# Patient Record
Sex: FEMALE | Race: WHITE | Employment: UNEMPLOYED | URBAN - METROPOLITAN AREA
[De-identification: names, ages, dates, MRNs, and addresses within clinical notes are randomized per-mention and may not be internally consistent; named-entity substitution may affect disease eponyms.]

---

## 2022-03-19 PROBLEM — D50.0 IRON DEFICIENCY ANEMIA DUE TO CHRONIC BLOOD LOSS: Status: ACTIVE | Noted: 2021-10-05

## 2022-06-08 ENCOUNTER — OFFICE VISIT (OUTPATIENT)
Dept: OBGYN CLINIC | Age: 47
End: 2022-06-08
Payer: COMMERCIAL

## 2022-06-08 VITALS
DIASTOLIC BLOOD PRESSURE: 90 MMHG | BODY MASS INDEX: 26 KG/M2 | HEIGHT: 59 IN | SYSTOLIC BLOOD PRESSURE: 152 MMHG | WEIGHT: 129 LBS

## 2022-06-08 DIAGNOSIS — R45.4 IRRITABILITY: ICD-10-CM

## 2022-06-08 DIAGNOSIS — N91.2 AMENORRHEA, UNSPECIFIED: ICD-10-CM

## 2022-06-08 DIAGNOSIS — R23.2 HOT FLASHES: ICD-10-CM

## 2022-06-08 DIAGNOSIS — N91.2 AMENORRHEA, UNSPECIFIED: Primary | ICD-10-CM

## 2022-06-08 PROCEDURE — 99214 OFFICE O/P EST MOD 30 MIN: CPT | Performed by: OBSTETRICS & GYNECOLOGY

## 2022-06-08 ASSESSMENT — ENCOUNTER SYMPTOMS
GASTROINTESTINAL NEGATIVE: 1
ALLERGIC/IMMUNOLOGIC NEGATIVE: 1
RESPIRATORY NEGATIVE: 1
EYES NEGATIVE: 1

## 2022-06-08 NOTE — PROGRESS NOTES
Name: Meryle Ellison  Date: 2022  YOB: 1975  LMP: Patient's last menstrual period was 2022. Jose Crow is a 56 y.o.   who is here for a problem visit. She historically has had menorrhagia, anemia and dysmenorrhea. She had the worst period ever in Feb than a lighter one in March and nothing since then. She would be ok with that but has developed headaches, moodiness, difficulty sleeping, hot flashes. Us in : enlarge 221cm3    Birth control: BTL  Menstrual status: irregular cycles and heavy  Gyn Surgery: s/p BTL    Health Maintenance:  Pap Smear: last pap in 2021, pathology WNL  Mammo: never done  HPV vaccine: not done  Colonoscopy: not ever done  Dexa scan: not needed    No flowsheet data found. No flowsheet data found. Review of Systems   Constitutional: Negative. HENT: Negative. Eyes: Negative. Respiratory: Negative. Cardiovascular: Negative. Gastrointestinal: Negative. Endocrine: Negative. Genitourinary: Positive for dyspareunia, menstrual problem and vaginal bleeding. Musculoskeletal: Negative. Skin: Negative. Allergic/Immunologic: Negative. Neurological: Negative. Hematological: Negative. Psychiatric/Behavioral: Negative. Past Medical History:  No date: Anemia  No date: Fibroid, uterine     Past Surgical History:  2853,4849:  SECTION      Comment:  x2  No date: LAP,TUBAL CAUTERY       Current Outpatient Medications:     ferrous sulfate 220 (44 Fe) MG/5ML solution, Take by mouth daily (Patient not taking: Reported on 2022), Disp: , Rfl:     LORazepam (ATIVAN) 0.5 MG tablet, Take 1 tab twice a day as needed for anxiety. (Patient not taking: Reported on 2022), Disp: , Rfl:     Family Cancer History:   Cancer-related family history includes Cancer in her sister. There is no history of Ovarian Cancer, Colon Cancer, or Breast Cancer.      Social History:  reports that she has quit smoking. She has never used smokeless tobacco. She reports previous alcohol use. She reports that she does not use drugs. Ob History:  No obstetric history on file. Sexual History:  reports being sexually active and has had partner(s) who are male. She reports using the following method of birth control/protection: Surgical.    PHYSICAL EXAM:  Vitals:  height is 4' 11\" (1.499 m) and weight is 129 lb (58.5 kg). Her blood pressure is 152/90 (abnormal). Body mass index is 26.05 kg/m². Physical Exam  Constitutional:       General: She is awake. She is not in acute distress. Appearance: Normal appearance. She is not ill-appearing. HENT:      Head: Normocephalic and atraumatic. Eyes:      Conjunctiva/sclera: Conjunctivae normal.   Cardiovascular:      Rate and Rhythm: Normal rate. Pulmonary:      Effort: Pulmonary effort is normal.   Musculoskeletal:         General: Normal range of motion. Cervical back: Normal range of motion. Skin:     General: Skin is warm and dry. Neurological:      General: No focal deficit present. Mental Status: She is alert and oriented to person, place, and time. Motor: Motor function is intact. Coordination: Coordination is intact. Psychiatric:         Behavior: Behavior normal.         Cognition and Memory: Cognition normal.         Judgment: Judgment normal.          The external genitalia is normal.  Urethral meatus is normal. Vagina is pink and rugated. The bladder and urethra are normal .  The cervix is normal.  The uterus is normal and enlarged. The ovaries are normal.       Assessment: 56 y.o. , No obstetric history on file. , here for problem visit  1. amenorrhea: will check hrt labs    2. Mood changes , irritability, increases in headaches with auras.      Ila Milton MD

## 2022-06-09 LAB
25(OH)D3 SERPL-MCNC: 20.9 NG/ML (ref 30–100)
BASOPHILS # BLD: 0 K/UL (ref 0–0.2)
BASOPHILS NFR BLD: 1 % (ref 0–2)
DIFFERENTIAL METHOD BLD: ABNORMAL
EOSINOPHIL # BLD: 0.1 K/UL (ref 0–0.8)
EOSINOPHIL NFR BLD: 1 % (ref 0.5–7.8)
ERYTHROCYTE [DISTWIDTH] IN BLOOD BY AUTOMATED COUNT: 13.4 % (ref 11.9–14.6)
ESTRADIOL SERPL-MCNC: 298.55 PG/ML
FSH SERPL-ACNC: 1.9 MIU/ML
HCT VFR BLD AUTO: 43.8 % (ref 35.8–46.3)
HGB BLD-MCNC: 14.3 G/DL (ref 11.7–15.4)
IMM GRANULOCYTES # BLD AUTO: 0 K/UL (ref 0–0.5)
IMM GRANULOCYTES NFR BLD AUTO: 0 % (ref 0–5)
LH SERPL-ACNC: 5.2 MIU/ML
LYMPHOCYTES # BLD: 1.6 K/UL (ref 0.5–4.6)
LYMPHOCYTES NFR BLD: 28 % (ref 13–44)
MCH RBC QN AUTO: 27.3 PG (ref 26.1–32.9)
MCHC RBC AUTO-ENTMCNC: 32.6 G/DL (ref 31.4–35)
MCV RBC AUTO: 83.7 FL (ref 79.6–97.8)
MONOCYTES # BLD: 0.4 K/UL (ref 0.1–1.3)
MONOCYTES NFR BLD: 7 % (ref 4–12)
NEUTS SEG # BLD: 3.5 K/UL (ref 1.7–8.2)
NEUTS SEG NFR BLD: 63 % (ref 43–78)
NRBC # BLD: 0 K/UL (ref 0–0.2)
PLATELET # BLD AUTO: 244 K/UL (ref 150–450)
PMV BLD AUTO: 11.3 FL (ref 9.4–12.3)
RBC # BLD AUTO: 5.23 M/UL (ref 4.05–5.2)
T4 FREE SERPL-MCNC: 1 NG/DL (ref 0.78–1.46)
WBC # BLD AUTO: 5.6 K/UL (ref 4.3–11.1)

## 2022-06-22 ENCOUNTER — OFFICE VISIT (OUTPATIENT)
Dept: OBGYN CLINIC | Age: 47
End: 2022-06-22
Payer: COMMERCIAL

## 2022-06-22 ENCOUNTER — PREP FOR PROCEDURE (OUTPATIENT)
Dept: OBGYN CLINIC | Age: 47
End: 2022-06-22

## 2022-06-22 ENCOUNTER — TELEPHONE (OUTPATIENT)
Dept: OBGYN CLINIC | Age: 47
End: 2022-06-22

## 2022-06-22 ENCOUNTER — HOSPITAL ENCOUNTER (EMERGENCY)
Age: 47
Discharge: HOME | End: 2022-06-22
Attending: EMERGENCY MEDICINE
Payer: COMMERCIAL

## 2022-06-22 VITALS
WEIGHT: 125 LBS | BODY MASS INDEX: 25.2 KG/M2 | HEART RATE: 80 BPM | DIASTOLIC BLOOD PRESSURE: 90 MMHG | TEMPERATURE: 98.5 F | HEIGHT: 59 IN | SYSTOLIC BLOOD PRESSURE: 125 MMHG | RESPIRATION RATE: 18 BRPM | OXYGEN SATURATION: 97 %

## 2022-06-22 VITALS
BODY MASS INDEX: 25.4 KG/M2 | WEIGHT: 126 LBS | DIASTOLIC BLOOD PRESSURE: 84 MMHG | SYSTOLIC BLOOD PRESSURE: 124 MMHG | HEIGHT: 59 IN

## 2022-06-22 DIAGNOSIS — D25.1 INTRAMURAL, SUBMUCOUS, AND SUBSEROUS LEIOMYOMA OF UTERUS: ICD-10-CM

## 2022-06-22 DIAGNOSIS — D25.0 SUBMUCOUS UTERINE FIBROID: Primary | ICD-10-CM

## 2022-06-22 DIAGNOSIS — D25.9 UTERINE LEIOMYOMA, UNSPECIFIED LOCATION: Primary | ICD-10-CM

## 2022-06-22 DIAGNOSIS — D25.2 INTRAMURAL, SUBMUCOUS, AND SUBSEROUS LEIOMYOMA OF UTERUS: ICD-10-CM

## 2022-06-22 DIAGNOSIS — D25.0 INTRAMURAL, SUBMUCOUS, AND SUBSEROUS LEIOMYOMA OF UTERUS: ICD-10-CM

## 2022-06-22 DIAGNOSIS — N93.9 VAGINAL BLEEDING: ICD-10-CM

## 2022-06-22 DIAGNOSIS — N92.6 IRREGULAR MENSTRUAL BLEEDING: ICD-10-CM

## 2022-06-22 LAB
ABO + RH BLD: NORMAL
ALBUMIN SERPL-MCNC: 3.9 G/DL (ref 3.5–5)
ALBUMIN/GLOB SERPL: 1 {RATIO} (ref 1.2–3.5)
ALP SERPL-CCNC: 83 U/L (ref 50–136)
ALT SERPL-CCNC: 25 U/L (ref 12–65)
ANION GAP SERPL CALC-SCNC: 6 MMOL/L (ref 7–16)
AST SERPL-CCNC: 23 U/L (ref 15–37)
BACTERIA URNS QL MICRO: 0 /HPF
BASOPHILS # BLD: 0 K/UL (ref 0–0.2)
BASOPHILS NFR BLD: 0 % (ref 0–2)
BILIRUB SERPL-MCNC: 0.2 MG/DL (ref 0.2–1.1)
BLOOD GROUP ANTIBODIES SERPL: NORMAL
BUN SERPL-MCNC: 9 MG/DL (ref 6–23)
CALCIUM SERPL-MCNC: 8.6 MG/DL (ref 8.3–10.4)
CASTS URNS QL MICRO: 0 /LPF
CHLORIDE SERPL-SCNC: 107 MMOL/L (ref 98–107)
CO2 SERPL-SCNC: 27 MMOL/L (ref 21–32)
CREAT SERPL-MCNC: 0.74 MG/DL (ref 0.6–1)
CRYSTALS URNS QL MICRO: 0 /LPF
DIFFERENTIAL METHOD BLD: NORMAL
EOSINOPHIL # BLD: 0.1 K/UL (ref 0–0.8)
EOSINOPHIL NFR BLD: 2 % (ref 0.5–7.8)
EPI CELLS #/AREA URNS HPF: NORMAL /HPF
ERYTHROCYTE [DISTWIDTH] IN BLOOD BY AUTOMATED COUNT: 13.8 % (ref 11.9–14.6)
GLOBULIN SER CALC-MCNC: 3.9 G/DL (ref 2.3–3.5)
GLUCOSE SERPL-MCNC: 118 MG/DL (ref 65–100)
HCG UR QL: NEGATIVE
HCT VFR BLD AUTO: 39.8 % (ref 35.8–46.3)
HGB BLD-MCNC: 12.7 G/DL (ref 11.7–15.4)
IMM GRANULOCYTES # BLD AUTO: 0 K/UL (ref 0–0.5)
IMM GRANULOCYTES NFR BLD AUTO: 0 % (ref 0–5)
LYMPHOCYTES # BLD: 3 K/UL (ref 0.5–4.6)
LYMPHOCYTES NFR BLD: 37 % (ref 13–44)
MCH RBC QN AUTO: 26.7 PG (ref 26.1–32.9)
MCHC RBC AUTO-ENTMCNC: 31.9 G/DL (ref 31.4–35)
MCV RBC AUTO: 83.8 FL (ref 79.6–97.8)
MONOCYTES # BLD: 0.8 K/UL (ref 0.1–1.3)
MONOCYTES NFR BLD: 10 % (ref 4–12)
MUCOUS THREADS URNS QL MICRO: 0 /LPF
NEUTS SEG # BLD: 4.1 K/UL (ref 1.7–8.2)
NEUTS SEG NFR BLD: 51 % (ref 43–78)
NRBC # BLD: 0 K/UL (ref 0–0.2)
OTHER OBSERVATIONS: NORMAL
PLATELET # BLD AUTO: 340 K/UL (ref 150–450)
PMV BLD AUTO: 10.3 FL (ref 9.4–12.3)
POTASSIUM SERPL-SCNC: 3.6 MMOL/L (ref 3.5–5.1)
PROT SERPL-MCNC: 7.8 G/DL (ref 6.3–8.2)
RBC # BLD AUTO: 4.75 M/UL (ref 4.05–5.2)
RBC #/AREA URNS HPF: >100 /HPF
SODIUM SERPL-SCNC: 140 MMOL/L (ref 136–145)
SPECIMEN EXP DATE BLD: NORMAL
WBC # BLD AUTO: 8 K/UL (ref 4.3–11.1)
WBC URNS QL MICRO: NORMAL /HPF

## 2022-06-22 PROCEDURE — 99214 OFFICE O/P EST MOD 30 MIN: CPT | Performed by: OBSTETRICS & GYNECOLOGY

## 2022-06-22 PROCEDURE — 80053 COMPREHEN METABOLIC PANEL: CPT

## 2022-06-22 PROCEDURE — 99284 EMERGENCY DEPT VISIT MOD MDM: CPT

## 2022-06-22 PROCEDURE — 85025 COMPLETE CBC W/AUTO DIFF WBC: CPT

## 2022-06-22 PROCEDURE — 2580000003 HC RX 258: Performed by: EMERGENCY MEDICINE

## 2022-06-22 PROCEDURE — 81025 URINE PREGNANCY TEST: CPT

## 2022-06-22 PROCEDURE — 96360 HYDRATION IV INFUSION INIT: CPT

## 2022-06-22 PROCEDURE — 86901 BLOOD TYPING SEROLOGIC RH(D): CPT

## 2022-06-22 PROCEDURE — 81015 MICROSCOPIC EXAM OF URINE: CPT

## 2022-06-22 RX ORDER — 0.9 % SODIUM CHLORIDE 0.9 %
1000 INTRAVENOUS SOLUTION INTRAVENOUS
Status: COMPLETED | OUTPATIENT
Start: 2022-06-22 | End: 2022-06-22

## 2022-06-22 RX ORDER — MEGESTROL ACETATE 20 MG/1
20 TABLET ORAL DAILY
Qty: 30 TABLET | Refills: 0 | Status: SHIPPED | OUTPATIENT
Start: 2022-06-22 | End: 2022-06-22 | Stop reason: ALTCHOICE

## 2022-06-22 RX ORDER — TRANEXAMIC ACID 650 1/1
1300 TABLET ORAL 3 TIMES DAILY PRN
Qty: 30 TABLET | Refills: 12 | Status: SHIPPED | OUTPATIENT
Start: 2022-06-22 | End: 2022-06-27

## 2022-06-22 RX ADMIN — SODIUM CHLORIDE 1000 ML: 9 INJECTION, SOLUTION INTRAVENOUS at 01:57

## 2022-06-22 ASSESSMENT — PAIN SCALES - GENERAL: PAINLEVEL_OUTOF10: 8

## 2022-06-22 ASSESSMENT — PAIN DESCRIPTION - DESCRIPTORS: DESCRIPTORS: CRAMPING

## 2022-06-22 ASSESSMENT — ENCOUNTER SYMPTOMS
ABDOMINAL PAIN: 1
VOMITING: 0
COLOR CHANGE: 0
BACK PAIN: 1
SHORTNESS OF BREATH: 0
DIARRHEA: 0
SORE THROAT: 0
CONSTIPATION: 0
COUGH: 0
NAUSEA: 0
RHINORRHEA: 0

## 2022-06-22 ASSESSMENT — PAIN DESCRIPTION - ORIENTATION: ORIENTATION: LOWER

## 2022-06-22 ASSESSMENT — PAIN - FUNCTIONAL ASSESSMENT: PAIN_FUNCTIONAL_ASSESSMENT: 0-10

## 2022-06-22 ASSESSMENT — PAIN DESCRIPTION - FREQUENCY: FREQUENCY: CONTINUOUS

## 2022-06-22 ASSESSMENT — PAIN DESCRIPTION - LOCATION: LOCATION: ABDOMEN

## 2022-06-22 ASSESSMENT — PAIN DESCRIPTION - PAIN TYPE: TYPE: ACUTE PAIN

## 2022-06-22 NOTE — ED NOTES
I have reviewed discharge instructions with the patient. The patient verbalized understanding. Patient left ED via Discharge Method: ambulatory to Home with self. Opportunity for questions and clarification provided. Patient given 1 scripts. To continue your aftercare when you leave the hospital, you may receive an automated call from our care team to check in on how you are doing. This is a free service and part of our promise to provide the best care and service to meet your aftercare needs.  If you have questions, or wish to unsubscribe from this service please call [phone]. Thank you for Choosing our [facility] Emergency Department.         Wander Hilton RN  06/22/22 2385

## 2022-06-22 NOTE — ED PROVIDER NOTES
[facility] Emergency Department Provider Note                   PCP:                Ellie Galvan               Age: 56 y.o. Sex: female       ICD-10-CM    1. Uterine leiomyoma, unspecified location  D25.9    2. Vaginal bleeding  N93.9        DISPOSITION         New Prescriptions    MEGESTROL (MEGACE) 20 MG TABLET    Take 1 tablet by mouth daily       Orders Placed This Encounter   Procedures    CBC with Auto Differential    Comprehensive Metabolic Panel    Urinalysis, Micro    PELVIC EXAM SET UP    POCT Urinalysis no Micro    POC Pregnancy Urine Qual    POC Pregnancy Urine Qual    TYPE AND SCREEN        Roger Eubanks III, MD 3:28 AM      MDM  Number of Diagnoses or Management Options  Diagnosis management comments: Spoke with Dr. Crystal Franz on-call for Dr. Ruben Villarreal. She recommends with stable hemoglobin to start Megace and have patient follow-up in the office. Will discharge. Amount and/or Complexity of Data Reviewed  Clinical lab tests: ordered and reviewed    Patient Progress  Patient progress: stable       Varun Mckenna is a 56 y.o. female who presents to the Emergency Department with chief complaint of    Chief Complaint   Patient presents with    Vaginal Bleeding      Patient with a history of uterine fibroids and menorrhagia with anemia. Is followed by Dr. Ruben Vlilarreal OB. She had a hemoglobin of 14 June 8. Was seen out of town and in [place] [id number] with vaginal bleeding on Sunday with a hemoglobin of 12. Has continued to bleed since then heavier this evening. Feeling weak and lightheaded. Heart rate is 122 on arrival.    The history is provided by the patient. No  was used.    Vaginal Bleeding  Quality:  Bright red  Severity:  Severe  Duration:  5 days  Timing:  Constant  Progression:  Worsening  Chronicity:  Recurrent  Menstrual history:  Irregular  Context: at rest    Relieved by:  Nothing  Worsened by:  Nothing  Ineffective treatments:  None tried  Associated symptoms: abdominal pain (mild cramping), back pain, dizziness and fatigue    Associated symptoms: no dysuria, no fever, no nausea and no vaginal discharge        All other systems reviewed and are negative. Review of Systems   Constitutional: Positive for fatigue. Negative for chills and fever. HENT: Negative for rhinorrhea and sore throat. Respiratory: Negative for cough and shortness of breath. Cardiovascular: Negative for chest pain and palpitations. Gastrointestinal: Positive for abdominal pain (mild cramping). Negative for constipation, diarrhea, nausea and vomiting. Genitourinary: Positive for vaginal bleeding. Negative for dysuria, hematuria and vaginal discharge. Musculoskeletal: Positive for back pain. Negative for neck pain. Skin: Negative for color change and rash. Neurological: Positive for dizziness and light-headedness. Negative for numbness and headaches. All other systems reviewed and are negative. Past Medical History:   Diagnosis Date    Anemia     Fibroid, uterine         Past Surgical History:   Procedure Laterality Date     SECTION  2333,0133    x2    LAP,TUBAL CAUTERY          Family History   Problem Relation Age of Onset    Cancer Sister         cervical    Ovarian Cancer Neg Hx     Colon Cancer Neg Hx     Breast Cancer Neg Hx            Social Connections:     Frequency of Communication with Friends and Family: Not on file    Frequency of Social Gatherings with Friends and Family: Not on file    Attends Anabaptism Services: Not on file    Active Member of Clubs or Organizations: Not on file    Attends Club or Organization Meetings: Not on file    Marital Status: Not on file        Allergies   Allergen Reactions    Amoxicillin Itching and Rash    Morphine Nausea And Vomiting        Vitals signs and nursing note reviewed.    Patient Vitals for the past 4 hrs:   Temp Pulse Resp BP SpO2   22 0135 98 °F (36.7 °C) (!) 122 20 (!) 183/100 98 %          Physical Exam  Vitals and nursing note reviewed. Exam conducted with a chaperone present. Constitutional:       Appearance: Normal appearance. HENT:      Head: Normocephalic and atraumatic. Cardiovascular:      Rate and Rhythm: Regular rhythm. Tachycardia present. Pulmonary:      Effort: Pulmonary effort is normal.      Breath sounds: Normal breath sounds. No wheezing. Abdominal:      General: Bowel sounds are normal.      Palpations: Abdomen is soft. Tenderness: There is no abdominal tenderness. Genitourinary:     Exam position: Supine. Vagina: Foreign body present. Bleeding present. Cervix: Dilated. Cervical bleeding present. Comments: Pt with a dilated os and pedunculated mass protruding through cervix. Musculoskeletal:         General: No swelling. Normal range of motion. Cervical back: Normal range of motion. No tenderness. Skin:     General: Skin is warm and dry. Neurological:      Mental Status: She is alert. Procedures        Labs Reviewed   COMPREHENSIVE METABOLIC PANEL - Abnormal; Notable for the following components:       Result Value    Anion Gap 6 (*)     Glucose 118 (*)     Globulin 3.9 (*)     Albumin/Globulin Ratio 1.0 (*)     All other components within normal limits   CBC WITH AUTO DIFFERENTIAL   URINALYSIS, MICRO   POC PREGNANCY UR-QUAL   POCT URINALYSIS DIPSTICK   POC PREGNANCY UR-QUAL   TYPE AND SCREEN        No orders to display                            Voice dictation software was used during the making of this note. This software is not perfect and grammatical and other typographical errors may be present. This note has not been completely proofread for errors.      Melanie Franz III, MD  06/22/22 0357

## 2022-06-22 NOTE — ED TRIAGE NOTES
Pt states that she is having heavy vaginal bleeding that began last week. States that she has been seen and treated twice this week for the same c/o without any relief.   States that she has something hanging out of her vaginal masked on arrival

## 2022-06-22 NOTE — PROGRESS NOTES
Concha Cerrato is a 55 yo WF [id number] here for ED f/u of heavy vaginal bleeding and probable prolapsed fibroid. Has experienced worsening bleeding over several days, last night got very heavy over a period of hours with cramping thus went to ED> Has a known h/o fibroids for a few years, last US in 24 [place] was enlarged at ~ 228cc. Only two small IM  fibroids were measured, but the ES was 23mm so I suspect this SM fibroid was present at that time. Patient of Mirta Owen. Cee Gupta Rx'd megace to help with bleeding. All relevant previous notes, labs and/or imaging performed by Dr. Mirta Sosa and the ED were reviewed and confirmed with pt today. I interpreted these results and D/W pt my opinion and recommendations accordingly. Nevahe Arguelles  has a past medical history of Anemia, Fibroid, uterine, and Prolonged emergence from general anesthesia. .    OB History    Para Term  AB Living   4 3 3   1 2   SAB IAB Ectopic Molar Multiple Live Births   1         2      # Outcome Date GA Lbr Branden/2nd Weight Sex Delivery Anes PTL Lv   4 Term      CS-LTranv      3 Term  40w0d  6 lb 6 oz (2.892 kg) F CS-LTranv   MARCELLO   2 Term  40w0d  6 lb 8 oz (2.948 kg) F Vag-Spont   MARCELLO   1 SAB                  Her surgeries include  has a past surgical history that includes lap,tubal cautery;  section (3744,0590); and Dilation and curettage of uterus (). .    Her current meds are   Current Outpatient Medications on File Prior to Visit   Medication Sig Dispense Refill    ferrous sulfate 220 (44 Fe) MG/5ML solution Take by mouth daily  (Patient not taking: Reported on 2022)      LORazepam (ATIVAN) 0.5 MG tablet Take 1 tab twice a day as needed for anxiety. (Patient not taking: Reported on 2022)       No current facility-administered medications on file prior to visit.          Allergies   Allergen Reactions    Amoxicillin Itching and Rash    Morphine Nausea And Vomiting        Family history is significant for family history includes Cancer in her sister. Jaelyn Davidson  reports that she has quit smoking. She has never used smokeless tobacco. She reports previous alcohol use. She reports that she does not use drugs. She completed her Systems Review which is documented below. I tried to relate any problem to gyn systems and if not related she is referred to her PCP. Blood pressure 124/84, height 4' 11\" (1.499 m), weight 126 lb (57.2 kg). Body mass index is 25.45 kg/m². A&Ox3. NAD  NL thought/judgment  Psych - grossly NL, not anxious  Neuro - CN 2-12 grossly intact. NL gait and movement  HEENT - NC/AT EOMi, PERRL  Pelvic NL EGBUS and vagina. Blood present. There is a 2x3 prolapsed fibroid extending fully past the external cervical os. I tried to reduce it with ring forceps several times w/o success. Bimanual reveals the travis to be 8-10 wk sized and irregular. Cassia Mckeon was seen today for fibroids. Diagnoses and all orders for this visit:    Submucous uterine fibroid    Irregular menstrual bleeding  -     tranexamic acid (LYSTEDA) 650 MG TABS tablet; Take 2 tablets by mouth 3 times daily as needed (Heavy Menses) Take only on heavy days    Intramural, submucous, and subserous leiomyoma of uterus     Surgery recommend at least to remove the prolapsed fibroid. Also discussed hyst but prefers to avoid major surgery. Agree removal of this should clear up her sx, and if not a future hyst could be warranted. Rec proceed with surgery in 2 days but wants to wait a few days extra d/t her 's schedule. Will start TXA to help control bleeding and stop the megace which she did not fill. Me, use and s/e reviewed. Procedure to remove the fibroid removed in detail. Also will perform D&C/hysteroscopy to eval endometrium at the time. All questions answered to the patient's satisfaction. No follow-ups on file.     Davon Franz MD

## 2022-06-23 ENCOUNTER — ANESTHESIA (OUTPATIENT)
Dept: SURGERY | Age: 47
End: 2022-06-23
Payer: COMMERCIAL

## 2022-06-23 ENCOUNTER — HOSPITAL ENCOUNTER (EMERGENCY)
Age: 47
Discharge: HOME | End: 2022-06-24
Attending: EMERGENCY MEDICINE
Payer: COMMERCIAL

## 2022-06-23 ENCOUNTER — ANESTHESIA EVENT (OUTPATIENT)
Dept: SURGERY | Age: 47
End: 2022-06-23
Payer: COMMERCIAL

## 2022-06-23 DIAGNOSIS — D64.9 SYMPTOMATIC ANEMIA: ICD-10-CM

## 2022-06-23 DIAGNOSIS — N93.8 DYSFUNCTIONAL UTERINE BLEEDING: Primary | ICD-10-CM

## 2022-06-23 DIAGNOSIS — D25.9 FIBROID OF CERVIX: ICD-10-CM

## 2022-06-23 DIAGNOSIS — D50.0 IRON DEFICIENCY ANEMIA DUE TO CHRONIC BLOOD LOSS: ICD-10-CM

## 2022-06-23 LAB
ALBUMIN SERPL-MCNC: 3.7 G/DL (ref 3.5–5)
ALBUMIN/GLOB SERPL: 1 {RATIO} (ref 1.2–3.5)
ALP SERPL-CCNC: 75 U/L (ref 50–136)
ALT SERPL-CCNC: 21 U/L (ref 12–65)
ANION GAP SERPL CALC-SCNC: 4 MMOL/L (ref 7–16)
AST SERPL-CCNC: 16 U/L (ref 15–37)
BASOPHILS # BLD: 0.1 K/UL (ref 0–0.2)
BASOPHILS NFR BLD: 1 % (ref 0–2)
BILIRUB SERPL-MCNC: 0.2 MG/DL (ref 0.2–1.1)
BUN SERPL-MCNC: 7 MG/DL (ref 6–23)
CALCIUM SERPL-MCNC: 8.4 MG/DL (ref 8.3–10.4)
CHLORIDE SERPL-SCNC: 105 MMOL/L (ref 98–107)
CO2 SERPL-SCNC: 28 MMOL/L (ref 21–32)
CREAT SERPL-MCNC: 0.72 MG/DL (ref 0.6–1)
DIFFERENTIAL METHOD BLD: ABNORMAL
EOSINOPHIL # BLD: 0.1 K/UL (ref 0–0.8)
EOSINOPHIL NFR BLD: 2 % (ref 0.5–7.8)
ERYTHROCYTE [DISTWIDTH] IN BLOOD BY AUTOMATED COUNT: 13.8 % (ref 11.9–14.6)
GLOBULIN SER CALC-MCNC: 3.7 G/DL (ref 2.3–3.5)
GLUCOSE SERPL-MCNC: 106 MG/DL (ref 65–100)
HCT VFR BLD AUTO: 33.6 % (ref 35.8–46.3)
HGB BLD-MCNC: 10.8 G/DL (ref 11.7–15.4)
HISTORY CHECK: NORMAL
IMM GRANULOCYTES # BLD AUTO: 0 K/UL (ref 0–0.5)
IMM GRANULOCYTES NFR BLD AUTO: 0 % (ref 0–5)
LYMPHOCYTES # BLD: 2.4 K/UL (ref 0.5–4.6)
LYMPHOCYTES NFR BLD: 31 % (ref 13–44)
MCH RBC QN AUTO: 26.9 PG (ref 26.1–32.9)
MCHC RBC AUTO-ENTMCNC: 32.1 G/DL (ref 31.4–35)
MCV RBC AUTO: 83.6 FL (ref 79.6–97.8)
MONOCYTES # BLD: 0.6 K/UL (ref 0.1–1.3)
MONOCYTES NFR BLD: 8 % (ref 4–12)
NEUTS SEG # BLD: 4.4 K/UL (ref 1.7–8.2)
NEUTS SEG NFR BLD: 58 % (ref 43–78)
NRBC # BLD: 0 K/UL (ref 0–0.2)
PLATELET # BLD AUTO: 332 K/UL (ref 150–450)
PMV BLD AUTO: 9.9 FL (ref 9.4–12.3)
POTASSIUM SERPL-SCNC: 3.1 MMOL/L (ref 3.5–5.1)
PROT SERPL-MCNC: 7.4 G/DL (ref 6.3–8.2)
RBC # BLD AUTO: 4.02 M/UL (ref 4.05–5.2)
SODIUM SERPL-SCNC: 137 MMOL/L (ref 136–145)
WBC # BLD AUTO: 7.5 K/UL (ref 4.3–11.1)

## 2022-06-23 PROCEDURE — 99284 EMERGENCY DEPT VISIT MOD MDM: CPT

## 2022-06-23 PROCEDURE — 86901 BLOOD TYPING SEROLOGIC RH(D): CPT

## 2022-06-23 PROCEDURE — 96365 THER/PROPH/DIAG IV INF INIT: CPT

## 2022-06-23 PROCEDURE — 85025 COMPLETE CBC W/AUTO DIFF WBC: CPT

## 2022-06-23 PROCEDURE — 86923 COMPATIBILITY TEST ELECTRIC: CPT

## 2022-06-23 PROCEDURE — 2580000003 HC RX 258: Performed by: EMERGENCY MEDICINE

## 2022-06-23 PROCEDURE — 80053 COMPREHEN METABOLIC PANEL: CPT

## 2022-06-23 PROCEDURE — 2500000003 HC RX 250 WO HCPCS: Performed by: EMERGENCY MEDICINE

## 2022-06-23 PROCEDURE — 6370000000 HC RX 637 (ALT 250 FOR IP): Performed by: EMERGENCY MEDICINE

## 2022-06-23 RX ORDER — SODIUM CHLORIDE, SODIUM LACTATE, POTASSIUM CHLORIDE, CALCIUM CHLORIDE 600; 310; 30; 20 MG/100ML; MG/100ML; MG/100ML; MG/100ML
INJECTION, SOLUTION INTRAVENOUS CONTINUOUS
Status: CANCELLED | OUTPATIENT
Start: 2022-06-23

## 2022-06-23 RX ORDER — FENTANYL CITRATE 50 UG/ML
100 INJECTION, SOLUTION INTRAMUSCULAR; INTRAVENOUS
Status: CANCELLED | OUTPATIENT
Start: 2022-06-23 | End: 2022-06-23

## 2022-06-23 RX ORDER — MIDAZOLAM HYDROCHLORIDE 2 MG/2ML
2 INJECTION, SOLUTION INTRAMUSCULAR; INTRAVENOUS
Status: CANCELLED | OUTPATIENT
Start: 2022-06-23 | End: 2022-06-23

## 2022-06-23 RX ORDER — TRANEXAMIC ACID 10 MG/ML
1000 INJECTION, SOLUTION INTRAVENOUS ONCE
Status: COMPLETED | OUTPATIENT
Start: 2022-06-23 | End: 2022-06-23

## 2022-06-23 RX ORDER — MEGESTROL ACETATE 40 MG/1
60 TABLET ORAL ONCE
Status: COMPLETED | OUTPATIENT
Start: 2022-06-23 | End: 2022-06-23

## 2022-06-23 RX ORDER — SODIUM CHLORIDE 0.9 % (FLUSH) 0.9 %
5-40 SYRINGE (ML) INJECTION EVERY 12 HOURS SCHEDULED
Status: CANCELLED | OUTPATIENT
Start: 2022-06-23

## 2022-06-23 RX ORDER — ACETAMINOPHEN 500 MG
1000 TABLET ORAL ONCE
Status: CANCELLED | OUTPATIENT
Start: 2022-06-23 | End: 2022-06-23

## 2022-06-23 RX ORDER — SODIUM CHLORIDE 0.9 % (FLUSH) 0.9 %
5-40 SYRINGE (ML) INJECTION PRN
Status: CANCELLED | OUTPATIENT
Start: 2022-06-23

## 2022-06-23 RX ORDER — 0.9 % SODIUM CHLORIDE 0.9 %
1000 INTRAVENOUS SOLUTION INTRAVENOUS
Status: COMPLETED | OUTPATIENT
Start: 2022-06-23 | End: 2022-06-23

## 2022-06-23 RX ORDER — SODIUM CHLORIDE 9 MG/ML
INJECTION, SOLUTION INTRAVENOUS PRN
Status: DISCONTINUED | OUTPATIENT
Start: 2022-06-23 | End: 2022-06-24 | Stop reason: HOSPADM

## 2022-06-23 RX ADMIN — MEGESTROL ACETATE 60 MG: 40 TABLET ORAL at 21:28

## 2022-06-23 RX ADMIN — TRANEXAMIC ACID 1000 MG: 10 INJECTION, SOLUTION INTRAVENOUS at 21:28

## 2022-06-23 RX ADMIN — SODIUM CHLORIDE 1000 ML: 9 INJECTION, SOLUTION INTRAVENOUS at 20:20

## 2022-06-23 ASSESSMENT — ENCOUNTER SYMPTOMS
ABDOMINAL PAIN: 1
RESPIRATORY NEGATIVE: 1

## 2022-06-24 VITALS
OXYGEN SATURATION: 99 % | BODY MASS INDEX: 25.4 KG/M2 | TEMPERATURE: 98.2 F | WEIGHT: 126 LBS | HEIGHT: 59 IN | RESPIRATION RATE: 16 BRPM | SYSTOLIC BLOOD PRESSURE: 128 MMHG | HEART RATE: 98 BPM | DIASTOLIC BLOOD PRESSURE: 85 MMHG

## 2022-06-24 PROCEDURE — 7100000000 HC PACU RECOVERY - FIRST 15 MIN: Performed by: OBSTETRICS & GYNECOLOGY

## 2022-06-24 PROCEDURE — 3600000014 HC SURGERY LEVEL 4 ADDTL 15MIN: Performed by: OBSTETRICS & GYNECOLOGY

## 2022-06-24 PROCEDURE — 2500000003 HC RX 250 WO HCPCS: Performed by: ANESTHESIOLOGY

## 2022-06-24 PROCEDURE — 7100000010 HC PHASE II RECOVERY - FIRST 15 MIN: Performed by: OBSTETRICS & GYNECOLOGY

## 2022-06-24 PROCEDURE — 6360000002 HC RX W HCPCS: Performed by: NURSE ANESTHETIST, CERTIFIED REGISTERED

## 2022-06-24 PROCEDURE — 2500000003 HC RX 250 WO HCPCS: Performed by: NURSE ANESTHETIST, CERTIFIED REGISTERED

## 2022-06-24 PROCEDURE — 2580000003 HC RX 258: Performed by: NURSE ANESTHETIST, CERTIFIED REGISTERED

## 2022-06-24 PROCEDURE — 7100000001 HC PACU RECOVERY - ADDTL 15 MIN: Performed by: OBSTETRICS & GYNECOLOGY

## 2022-06-24 PROCEDURE — 6360000002 HC RX W HCPCS: Performed by: ANESTHESIOLOGY

## 2022-06-24 PROCEDURE — 2709999900 HC NON-CHARGEABLE SUPPLY: Performed by: OBSTETRICS & GYNECOLOGY

## 2022-06-24 PROCEDURE — 3700000000 HC ANESTHESIA ATTENDED CARE: Performed by: OBSTETRICS & GYNECOLOGY

## 2022-06-24 PROCEDURE — 88305 TISSUE EXAM BY PATHOLOGIST: CPT

## 2022-06-24 PROCEDURE — 58558 HYSTEROSCOPY BIOPSY: CPT | Performed by: OBSTETRICS & GYNECOLOGY

## 2022-06-24 PROCEDURE — 58145 MYOMECTOMY VAG METHOD: CPT | Performed by: OBSTETRICS & GYNECOLOGY

## 2022-06-24 PROCEDURE — 3700000001 HC ADD 15 MINUTES (ANESTHESIA): Performed by: OBSTETRICS & GYNECOLOGY

## 2022-06-24 PROCEDURE — 3600000004 HC SURGERY LEVEL 4 BASE: Performed by: OBSTETRICS & GYNECOLOGY

## 2022-06-24 RX ORDER — HYDROMORPHONE HYDROCHLORIDE 1 MG/ML
0.25 INJECTION, SOLUTION INTRAMUSCULAR; INTRAVENOUS; SUBCUTANEOUS EVERY 5 MIN PRN
Status: DISCONTINUED | OUTPATIENT
Start: 2022-06-24 | End: 2022-06-24 | Stop reason: HOSPADM

## 2022-06-24 RX ORDER — PROPOFOL 10 MG/ML
INJECTION, EMULSION INTRAVENOUS PRN
Status: DISCONTINUED | OUTPATIENT
Start: 2022-06-24 | End: 2022-06-24 | Stop reason: SDUPTHER

## 2022-06-24 RX ORDER — IBUPROFEN 800 MG/1
800 TABLET ORAL EVERY 6 HOURS PRN
Qty: 90 TABLET | Refills: 0 | Status: SHIPPED | OUTPATIENT
Start: 2022-06-24

## 2022-06-24 RX ORDER — ROCURONIUM BROMIDE 10 MG/ML
INJECTION, SOLUTION INTRAVENOUS PRN
Status: DISCONTINUED | OUTPATIENT
Start: 2022-06-24 | End: 2022-06-24 | Stop reason: SDUPTHER

## 2022-06-24 RX ORDER — DIPHENHYDRAMINE HYDROCHLORIDE 50 MG/ML
12.5 INJECTION INTRAMUSCULAR; INTRAVENOUS
Status: DISCONTINUED | OUTPATIENT
Start: 2022-06-24 | End: 2022-06-24 | Stop reason: HOSPADM

## 2022-06-24 RX ORDER — MIDAZOLAM HYDROCHLORIDE 1 MG/ML
INJECTION INTRAMUSCULAR; INTRAVENOUS PRN
Status: DISCONTINUED | OUTPATIENT
Start: 2022-06-24 | End: 2022-06-24 | Stop reason: SDUPTHER

## 2022-06-24 RX ORDER — DEXAMETHASONE SODIUM PHOSPHATE 10 MG/ML
INJECTION INTRAMUSCULAR; INTRAVENOUS PRN
Status: DISCONTINUED | OUTPATIENT
Start: 2022-06-24 | End: 2022-06-24 | Stop reason: SDUPTHER

## 2022-06-24 RX ORDER — OXYCODONE HYDROCHLORIDE 5 MG/1
5 TABLET ORAL PRN
Status: DISCONTINUED | OUTPATIENT
Start: 2022-06-24 | End: 2022-06-24 | Stop reason: HOSPADM

## 2022-06-24 RX ORDER — ONDANSETRON 2 MG/ML
4 INJECTION INTRAMUSCULAR; INTRAVENOUS
Status: DISCONTINUED | OUTPATIENT
Start: 2022-06-24 | End: 2022-06-24 | Stop reason: HOSPADM

## 2022-06-24 RX ORDER — ONDANSETRON 2 MG/ML
INJECTION INTRAMUSCULAR; INTRAVENOUS PRN
Status: DISCONTINUED | OUTPATIENT
Start: 2022-06-24 | End: 2022-06-24 | Stop reason: SDUPTHER

## 2022-06-24 RX ORDER — SODIUM CHLORIDE, SODIUM LACTATE, POTASSIUM CHLORIDE, CALCIUM CHLORIDE 600; 310; 30; 20 MG/100ML; MG/100ML; MG/100ML; MG/100ML
INJECTION, SOLUTION INTRAVENOUS CONTINUOUS
Status: DISCONTINUED | OUTPATIENT
Start: 2022-06-24 | End: 2022-06-24 | Stop reason: HOSPADM

## 2022-06-24 RX ORDER — HYDROMORPHONE HYDROCHLORIDE 1 MG/ML
0.5 INJECTION, SOLUTION INTRAMUSCULAR; INTRAVENOUS; SUBCUTANEOUS EVERY 5 MIN PRN
Status: DISCONTINUED | OUTPATIENT
Start: 2022-06-24 | End: 2022-06-24 | Stop reason: HOSPADM

## 2022-06-24 RX ORDER — LABETALOL HYDROCHLORIDE 5 MG/ML
10 INJECTION, SOLUTION INTRAVENOUS
Status: COMPLETED | OUTPATIENT
Start: 2022-06-24 | End: 2022-06-24

## 2022-06-24 RX ORDER — MIDAZOLAM HYDROCHLORIDE 2 MG/2ML
1 INJECTION, SOLUTION INTRAMUSCULAR; INTRAVENOUS 2 TIMES DAILY PRN
Status: DISCONTINUED | OUTPATIENT
Start: 2022-06-24 | End: 2022-06-24 | Stop reason: HOSPADM

## 2022-06-24 RX ORDER — FENTANYL CITRATE 50 UG/ML
INJECTION, SOLUTION INTRAMUSCULAR; INTRAVENOUS PRN
Status: DISCONTINUED | OUTPATIENT
Start: 2022-06-24 | End: 2022-06-24 | Stop reason: SDUPTHER

## 2022-06-24 RX ORDER — HYDROMORPHONE HYDROCHLORIDE 2 MG/1
2 TABLET ORAL EVERY 4 HOURS PRN
Qty: 5 TABLET | Refills: 0 | Status: SHIPPED | OUTPATIENT
Start: 2022-06-24 | End: 2022-06-27

## 2022-06-24 RX ORDER — SUCCINYLCHOLINE/SOD CL,ISO/PF 200MG/10ML
SYRINGE (ML) INTRAVENOUS PRN
Status: DISCONTINUED | OUTPATIENT
Start: 2022-06-24 | End: 2022-06-24 | Stop reason: SDUPTHER

## 2022-06-24 RX ORDER — SODIUM CHLORIDE, SODIUM LACTATE, POTASSIUM CHLORIDE, CALCIUM CHLORIDE 600; 310; 30; 20 MG/100ML; MG/100ML; MG/100ML; MG/100ML
INJECTION, SOLUTION INTRAVENOUS CONTINUOUS PRN
Status: DISCONTINUED | OUTPATIENT
Start: 2022-06-24 | End: 2022-06-24 | Stop reason: SDUPTHER

## 2022-06-24 RX ORDER — OXYCODONE HYDROCHLORIDE 5 MG/1
10 TABLET ORAL PRN
Status: DISCONTINUED | OUTPATIENT
Start: 2022-06-24 | End: 2022-06-24 | Stop reason: HOSPADM

## 2022-06-24 RX ORDER — LIDOCAINE HYDROCHLORIDE 20 MG/ML
INJECTION, SOLUTION EPIDURAL; INFILTRATION; INTRACAUDAL; PERINEURAL PRN
Status: DISCONTINUED | OUTPATIENT
Start: 2022-06-24 | End: 2022-06-24 | Stop reason: SDUPTHER

## 2022-06-24 RX ADMIN — PROPOFOL 200 MG: 10 INJECTION, EMULSION INTRAVENOUS at 00:21

## 2022-06-24 RX ADMIN — Medication 200 MG: at 00:21

## 2022-06-24 RX ADMIN — MIDAZOLAM 2 MG: 1 INJECTION INTRAMUSCULAR; INTRAVENOUS at 00:15

## 2022-06-24 RX ADMIN — DEXAMETHASONE SODIUM PHOSPHATE 6 MG: 10 INJECTION INTRAMUSCULAR; INTRAVENOUS at 00:27

## 2022-06-24 RX ADMIN — ONDANSETRON 4 MG: 2 INJECTION INTRAMUSCULAR; INTRAVENOUS at 00:27

## 2022-06-24 RX ADMIN — LIDOCAINE HYDROCHLORIDE 100 MG: 20 INJECTION, SOLUTION EPIDURAL; INFILTRATION; INTRACAUDAL; PERINEURAL at 00:21

## 2022-06-24 RX ADMIN — ROCURONIUM BROMIDE 5 MG: 50 INJECTION, SOLUTION INTRAVENOUS at 00:21

## 2022-06-24 RX ADMIN — MIDAZOLAM HYDROCHLORIDE 1 MG: 2 INJECTION, SOLUTION INTRAMUSCULAR; INTRAVENOUS at 01:43

## 2022-06-24 RX ADMIN — TRANEXAMIC ACID 50 ML/HR: 100 INJECTION, SOLUTION INTRAVENOUS at 00:56

## 2022-06-24 RX ADMIN — FENTANYL CITRATE 50 MCG: 50 INJECTION INTRAMUSCULAR; INTRAVENOUS at 00:21

## 2022-06-24 RX ADMIN — SODIUM CHLORIDE, SODIUM LACTATE, POTASSIUM CHLORIDE, AND CALCIUM CHLORIDE: 600; 310; 30; 20 INJECTION, SOLUTION INTRAVENOUS at 00:52

## 2022-06-24 RX ADMIN — LABETALOL HYDROCHLORIDE 10 MG: 5 INJECTION INTRAVENOUS at 01:25

## 2022-06-24 RX ADMIN — SODIUM CHLORIDE, SODIUM LACTATE, POTASSIUM CHLORIDE, AND CALCIUM CHLORIDE: 600; 310; 30; 20 INJECTION, SOLUTION INTRAVENOUS at 00:15

## 2022-06-24 NOTE — ED PROVIDER NOTES
[facility] Emergency Department Provider Note                   PCP:                Susi Carter MD               Age: 56 y.o. Sex: female       ICD-10-CM    1. Dysfunctional uterine bleeding  N93.8    2. Symptomatic anemia  D64.9        DISPOSITION         New Prescriptions    No medications on file       Orders Placed This Encounter   Procedures    CBC with Auto Differential    Comprehensive Metabolic Panel    Orthostatic blood pressure and pulse    TYPE AND SCREEN        Augustine Nguyen MD 9:29 PM      MDM  Number of Diagnoses or Management Options  Dysfunctional uterine bleeding  Symptomatic anemia  Diagnosis management comments: 52-year-old female presented to the emergency department with continued dysfunctional uterine bleeding. Patient's hemoglobin is dropped an additional 2 g from yesterday. Patient is now symptomatic with her anemia. Discussed with Dr. Geovanna Lora who is on for patient's OB/GYN group. Patient evaluated by Dr. Geovanna Lora in the emergency department and it was determined the patient will go to the operating room tonight. Patient was given TXA and Megace in the emergency department as well. Patient will be admitted to OB/GYN with expected surgery tonight.        Amount and/or Complexity of Data Reviewed  Clinical lab tests: ordered and reviewed  Tests in the radiology section of CPT®: reviewed  Decide to obtain previous medical records or to obtain history from someone other than the patient: yes  Review and summarize past medical records: yes  Discuss the patient with other providers: yes    Patient Progress  Patient progress: stable       Gab Velez is a 56 y.o. female who presents to the Emergency Department with chief complaint of    Chief Complaint   Patient presents with    Vaginal Bleeding      52-year-old  female with known history of prolapsed uterine fibroid and dysfunctional uterine bleeding presented to the emergency department with complaints of increased bleeding with significant amount of clots. Patient states that she is now feeling fatigued and lightheaded when she gets up and moves around. She states that she did okay throughout the day today however approximately 2 hours prior to arrival she started bleeding heavy again. Patient was seen in the emergency department yesterday and followed up with her GYN yesterday as well. She is scheduled for a D&C next week secondary to her bleeding. Patient was prescribed TXA by her OB/GYN however they were unable to get it because it was not at the pharmacy initially. Patient's  did  the TXA on the way to the emergency department however it has not been initiated. Patient denies any fever, chills, chest pain, shortness of breath, changes in bowel or bladder habits. Patient states that she feels like she is having contractions. She has no other complaints at this time. The history is provided by the patient and medical records. All other systems reviewed and are negative. Review of Systems   Constitutional: Positive for fatigue. HENT: Negative. Respiratory: Negative. Cardiovascular: Negative. Gastrointestinal: Positive for abdominal pain. Genitourinary: Positive for menstrual problem and vaginal bleeding. Musculoskeletal: Negative. Skin: Negative. Neurological: Negative. Psychiatric/Behavioral: Negative. All other systems reviewed and are negative.       Past Medical History:   Diagnosis Date    Anemia     Fibroid, uterine     Prolonged emergence from general anesthesia         Past Surgical History:   Procedure Laterality Date     SECTION  2754,8282    x2    DILATION AND CURETTAGE OF UTERUS      LAP,TUBAL CAUTERY          Family History   Problem Relation Age of Onset    Cancer Sister         cervical    Ovarian Cancer Neg Hx     Colon Cancer Neg Hx     Breast Cancer Neg Hx            Social Connections:     Frequency of Communication with Friends and Family: Not on file    Frequency of Social Gatherings with Friends and Family: Not on file    Attends Hinduism Services: Not on file    Active Member of Clubs or Organizations: Not on file    Attends Club or Organization Meetings: Not on file    Marital Status: Not on file        Allergies   Allergen Reactions    Amoxicillin Itching and Rash    Morphine Nausea And Vomiting        Vitals signs and nursing note reviewed. Patient Vitals for the past 4 hrs:   Temp Pulse Resp BP SpO2   06/23/22 2110 -- -- -- -- 100 %   06/23/22 2058 -- -- -- (!) 154/91 100 %   06/23/22 2057 -- -- -- (!) 155/86 100 %   06/23/22 2056 -- -- -- (!) 155/88 99 %   06/23/22 2049 -- -- -- -- 100 %   06/23/22 2030 -- -- -- (!) 162/92 100 %   06/23/22 2005 98.8 °F (37.1 °C) (!) 109 16 (!) 159/81 97 %          Physical Exam  Vitals and nursing note reviewed. Constitutional:       General: She is not in acute distress. Appearance: Normal appearance. She is not ill-appearing or toxic-appearing. HENT:      Head: Normocephalic and atraumatic. Mouth/Throat:      Mouth: Mucous membranes are moist.   Eyes:      Extraocular Movements: Extraocular movements intact. Pupils: Pupils are equal, round, and reactive to light. Cardiovascular:      Rate and Rhythm: Tachycardia present. Pulses: Normal pulses. Heart sounds: Normal heart sounds. Pulmonary:      Effort: Pulmonary effort is normal.      Breath sounds: Normal breath sounds. Abdominal:      Palpations: Abdomen is soft. Tenderness: There is no abdominal tenderness. There is no guarding or rebound. Musculoskeletal:         General: Normal range of motion. Cervical back: Normal range of motion. Skin:     General: Skin is warm. Neurological:      General: No focal deficit present. Mental Status: She is alert and oriented to person, place, and time.    Psychiatric:         Mood and Affect: Mood normal. Behavior: Behavior normal.         Thought Content: Thought content normal.         Judgment: Judgment normal.              Labs Reviewed   CBC WITH AUTO DIFFERENTIAL - Abnormal; Notable for the following components:       Result Value    RBC 4.02 (*)     Hemoglobin 10.8 (*)     Hematocrit 33.6 (*)     All other components within normal limits   COMPREHENSIVE METABOLIC PANEL - Abnormal; Notable for the following components:    Potassium 3.1 (*)     Anion Gap 4 (*)     Glucose 106 (*)     Globulin 3.7 (*)     Albumin/Globulin Ratio 1.0 (*)     All other components within normal limits   TYPE AND SCREEN        No orders to display                      ED Course as of 06/23/22 2129   Thu Jun 23, 2022 2052 Discussed with Dr. Vish Roberto. Requested 1 g of TXA given as well as 60 mg of Megace. She will come evaluate patient as well. [JL]      ED Course User Index  [JL] Jemima Valdez MD        Voice dictation software was used during the making of this note. This software is not perfect and grammatical and other typographical errors may be present. This note has not been completely proofread for errors.        Jemima Valdez MD  06/23/22 2129

## 2022-06-24 NOTE — ANESTHESIA PRE PROCEDURE
Department of Anesthesiology  Preprocedure Note       Name:  Carole Pappas   Age:  56 y.o.  :  1975                                          MRN:  [id number]         Date:  2022      Surgeon: Andrew Manzano):  Filiberto Lan MD    Procedure: Procedure(s):  LAPAROSCOPY DIAGNOSTIC    Medications prior to admission:   Prior to Admission medications    Medication Sig Start Date End Date Taking? Authorizing Provider   tranexamic acid (LYSTEDA) 650 MG TABS tablet Take 2 tablets by mouth 3 times daily as needed (Heavy Menses) Take only on heavy days 22  Xiomara Boyce MD   ferrous sulfate 220 (44 Fe) MG/5ML solution Take by mouth daily   Patient not taking: Reported on 2022    Ar Automatic Reconciliation   LORazepam (ATIVAN) 0.5 MG tablet Take 1 tab twice a day as needed for anxiety. Patient not taking: Reported on 2022 10/5/21   Ar Automatic Reconciliation       Current medications:    Current Facility-Administered Medications   Medication Dose Route Frequency Provider Last Rate Last Admin    0.9 % sodium chloride infusion   IntraVENous PRN Filiberto Lan MD         Current Outpatient Medications   Medication Sig Dispense Refill    tranexamic acid (LYSTEDA) 650 MG TABS tablet Take 2 tablets by mouth 3 times daily as needed (Heavy Menses) Take only on heavy days 30 tablet 12    ferrous sulfate 220 (44 Fe) MG/5ML solution Take by mouth daily  (Patient not taking: Reported on 2022)      LORazepam (ATIVAN) 0.5 MG tablet Take 1 tab twice a day as needed for anxiety. (Patient not taking: Reported on 2022)         Allergies:     Allergies   Allergen Reactions    Amoxicillin Itching and Rash    Morphine Nausea And Vomiting       Problem List:    Patient Active Problem List   Diagnosis Code    Iron deficiency anemia due to chronic blood loss D50.0    Fibroid of cervix D25.9       Past Medical History:        Diagnosis Date    Anemia     Fibroid, uterine     Prolonged emergence from general anesthesia        Past Surgical History:        Procedure Laterality Date     SECTION  7950,7384    x2    DILATION AND CURETTAGE OF UTERUS      LAP,TUBAL CAUTERY         Social History:    Social History     Tobacco Use    Smoking status: Former Smoker    Smokeless tobacco: Never Used    Tobacco comment: Quit smoking: at age 22, social   Substance Use Topics    Alcohol use: Not Currently                                Counseling given: Not Answered  Comment: Quit smoking: at age 22, social      Vital Signs (Current):   Vitals:    22   BP: (!) 154/91   (!) 161/92   Pulse:       Resp:       Temp:       TempSrc:       SpO2: 100% 100% 100%    Weight:       Height:                                                  BP Readings from Last 3 Encounters:   22 (!) 161/92   22 124/84   22 (!) 125/90       NPO Status:                                                                                 BMI:   Wt Readings from Last 3 Encounters:   22 126 lb (57.2 kg)   22 126 lb (57.2 kg)   22 125 lb (56.7 kg)     Body mass index is 25.45 kg/m². CBC:   Lab Results   Component Value Date    WBC 7.5 2022    RBC 4.02 2022    HGB 10.8 2022    HCT 33.6 2022    MCV 83.6 2022    RDW 13.8 2022     2022       CMP:   Lab Results   Component Value Date     2022    K 3.1 2022     2022    CO2 28 2022    BUN 7 2022    CREATININE 0.72 2022    GFRAA >60 2022    LABGLOM >60 2022    GLUCOSE 106 2022    PROT 7.4 2022    CALCIUM 8.4 2022    BILITOT 0.2 2022    ALKPHOS 75 2022    AST 16 2022    ALT 21 2022       POC Tests: No results for input(s): POCGLU, POCNA, POCK, POCCL, POCBUN, POCHEMO, POCHCT in the last 72 hours.     Coags: No results found for: PROTIME, INR, APTT    HCG (If Applicable):   Lab Results   Component Value Date    PREGTESTUR Negative 06/22/2022        ABGs: No results found for: PHART, PO2ART, OQM2OKL, GNQ3RJX, BEART, O0JJIKII     Type & Screen (If Applicable):  No results found for: LABABO, LABRH    Drug/Infectious Status (If Applicable):  No results found for: HIV, HEPCAB    COVID-19 Screening (If Applicable): No results found for: COVID19        Anesthesia Evaluation  Patient summary reviewed and Nursing notes reviewed no history of anesthetic complications (states she is sensitive to meds):   Airway: Mallampati: II  TM distance: >3 FB   Neck ROM: full     Dental: normal exam         Pulmonary: breath sounds clear to auscultation      Smoker: ex.                           Cardiovascular:  Exercise tolerance: good (>4 METS),           Rhythm: regular  Rate: normal                    Neuro/Psych:   (+) headaches:, depression/anxiety  (extreme anxiety)            GI/Hepatic/Renal: Neg GI/Hepatic/Renal ROS            Endo/Other: Negative Endo/Other ROS                    Abdominal:             Vascular: Other Findings:           Anesthesia Plan      general     ASA 2 - emergent     (Discussed possibility of epidural or spinal with patient. She has a lot of anxiety about having a GA. I explained that it may not be comfortable for her if a more extensive surgery is needed. Pt understood and consents to GA.)  Induction: intravenous and rapid sequence. Anesthetic plan and risks discussed with patient and spouse. Use of blood products discussed with patient whom.                      Km Hurtado MD   6/23/2022

## 2022-06-24 NOTE — OP NOTE
GYN FULL OP NOTE    Patient ID:      Name: Bakari Mcginnis    Medical Record Number: [id number]    YOB: 1975    DATE OF PROCEDURE:  6/24/2022    PREOPERATIVE DIAGNOSIS:  52yo [id number] with HMB/IMB, known enlarged fibroid uterus with pedunculated, prolapsed fibroid coming out of cervix      POSTOPERATIVE DIAGNOSIS:  CLARISA     PROCEDURE: Exam under anesthesia, vaginal myomectomy, Hysteroscopy, dilatation & curettage with multiple polypectomies    SURGEON:  Theodore Kelley MD    ASSISTANT:  none    ANESTHESIA: general    EBL: 150 cc    FINDINGS: large pedunculated, prolapsed fibroid (approx 4x3cm) extending through the cervical os in the vaginal vault. Significant amount of polyploid appearing endometrium    SPECIMENS:  Fibroid, Endometrial curretings and polyps     ATIBIOTICS:  NONE    PROCEDURE IN DETAIL: The patient was placed on the Operating Room table in the supine position. The patient underwent general endotracheal anesthesia and was repositioned in the dorsal lithotomy position, prepped and draped in the usual sterile fashion for vaginal surgery. Time out was done to confirm the operating procedure, surgeon, patient and site. Once confirmed by the team, procedure was started. Bladder was emptied. The fibroid was exposed with a weighted vaginal speculum and grasped with a tenaculum. Initially, I attempted to tie the prolapsed fibroid off w/ an Endoloop, but the fibroid came off the stalk with minimal tension. No cervical masses noted after fibroid removed. The uterus was then sounded to 10 cm and the cervix dilated to 23 [place]. Diagnostic hysteroscope was introduced into the endometrial cavity using saline. The findings were noted as above. A edgardo stone polyp forceps were used initially to remove the numerous intrauterine polyps followed by a thorough endometrial curettage with a serrated currette.  Repeat curettage was performed as needed following reinspection of the uterine cavity with a hysteroscope using saline as a distention median was accomplished without problems. The endometrial cavity appeared normal following the curettage. Following the case, the hysteroscope was removed. The tenaculum was removed from the cervix. Hemostasis was assured. All instruments and retractors were removed from her vagina. She was then cleaned up, awakened, and transferred to the Recovery Room in satisfactory condition. All counts were correct.       Fiona Arechiga MD  June 24, 2022  1:17 AM

## 2022-06-24 NOTE — ED NOTES
MD at bedside. Tello Rendon, [facility]  06/23/22 1702

## 2022-06-24 NOTE — ED NOTES
Pt clothes and jewelry in pt belongings bag and given to pt family. Pt has used the restroom and pt in gown. Family at bedside. Consents on clipboard. Dima Ford  06/23/22 2141       Jacqueline Burnett.  Winter Szymanski RN  06/23/22 7380

## 2022-06-24 NOTE — ED TRIAGE NOTES
Pt has been having heavy vaginal bleeding for approx 1 week and has been seen and treated for the same c/o. Scheduled for D and C on Tuesday. Continues to pass large blood clots from her vagina. Given a prescription for TXE but the drug didn't have the meds.

## 2022-06-24 NOTE — H&P
Gynecology History and Physical    Arlette Hampton  [id number]    Subjective:       Chief complaint:  Heavy/irregular vaginal bleeding, known fibroid uterus with prolapsed, pedunculated fibroid coming from the cervix    Terrell Parekh is a 56 y.o.   female with a history of known enlarged fibroid uterus presents yet again to the ER with c/o HMB/IMB --increased over the past 2 hours. States has gone through 2 pads already in the ER in 1hour. Pt was just seen by my partner Dr Mike Silver in office yesterday as an ER FU for similar issue. Has experienced worsening bleeding over several days, last night got very heavy over a period of hours with cramping thus went to ED. Has a known h/o fibroids for a few years, last US in  Uterus was enlarged at ~ 228cc. Only two small IM  fibroids were measured, but the EMS was 23mm so I suspect this same fibroid was present at that time. Patient of [facility]. The ER Rx'd megace to help with bleeding but pt did not . Surgery was recommend at that time to at least to remove the prolapsed fibroid. they also discussed hyst but pt prefers to avoid major surgery. Pt wanted to wait a few days before surgery due to her 's schedule. She was given Rx for TXA yesterday to help control bleeding and was instructed to not fill the megace at that time. She has not yet filled the TXA either.         The current method of family planning is BTL       Prior imaging:  TVUS 21  US: uterus 227cm3, endo thickness 22.6, multiple fibroids 1-2 cm  Left ovary: small hem cyst  Right ovary: 2 small cysts        Past Medical History:   Diagnosis Date    Anemia     Fibroid, uterine     Prolonged emergence from general anesthesia      Past Surgical History:   Procedure Laterality Date     SECTION  5971,8330    x2    DILATION AND CURETTAGE OF UTERUS      LAP,TUBAL CAUTERY       OB History        4    Para   3    Term   3     AB   1    Living   2       SAB   1    IAB        Ectopic        Molar        Multiple        Live Births   2                Allergies   Allergen Reactions    Amoxicillin Itching and Rash    Morphine Nausea And Vomiting       [unfilled]    Family History   Problem Relation Age of Onset    Cancer Sister         cervical    Ovarian Cancer Neg Hx     Colon Cancer Neg Hx     Breast Cancer Neg Hx      Social History     Socioeconomic History    Marital status:      Spouse name: Not on file    Number of children: Not on file    Years of education: Not on file    Highest education level: Not on file   Occupational History    Not on file   Tobacco Use    Smoking status: Former Smoker    Smokeless tobacco: Never Used    Tobacco comment: Quit smoking: at age 22, social   Substance and Sexual Activity    Alcohol use: Not Currently    Drug use: Never    Sexual activity: Yes     Partners: Male     Birth control/protection: Surgical     Comment: tubal   Other Topics Concern    Not on file   Social History Narrative    Not on file     Social Determinants of Health     Financial Resource Strain:     Difficulty of Paying Living Expenses: Not on file   Food Insecurity:     Worried About Running Out of Food in the Last Year: Not on file    [place]quin of Food in the Last Year: Not on file   Transportation Needs:     Lack of Transportation (Medical): Not on file    Lack of Transportation (Non-Medical):  Not on file   Physical Activity:     Days of Exercise per Week: Not on file    Minutes of Exercise per Session: Not on file   Stress:     Feeling of Stress : Not on file   Social Connections:     Frequency of Communication with Friends and Family: Not on file    Frequency of Social Gatherings with Friends and Family: Not on file    Attends Tenriism Services: Not on file    Active Member of Clubs or Organizations: Not on file    Attends Club or Organization Meetings: Not on file    Marital Status: Not on file   Intimate Partner Violence:     Fear of Current or Ex-Partner: Not on file    Emotionally Abused: Not on file    Physically Abused: Not on file    Sexually Abused: Not on file   Housing Stability:     Unable to Pay for Housing in the Last Year: Not on file    Number of [place] in the Last Year: Not on file    Unstable Housing in the Last Year: Not on file         Review of Systems:  Negative except as per HPI      Objective:     Vitals:    06/23/22 2058 06/23/22 2110 06/23/22 2119 06/23/22 2130   BP: (!) 154/91   (!) 161/92   Pulse:       Resp:       Temp:       TempSrc:       SpO2: 100% 100% 100%    Weight:       Height:           Physical Exam:      BP (!) 161/92   Pulse (!) 109   Temp 98.8 °F (37.1 °C) (Oral)   Resp 16   Ht 4' 11\" (1.499 m)   Wt 126 lb (57.2 kg)   SpO2 100%   BMI 25.45 kg/m²     Constitutional: She appears well-developed and well-nourished. No distress. HENT:    Head: Normocephalic and atraumatic. Cardiovascular: Tachycardic, regular rhythm and normal heart sounds. Pulmonary/Chest: Effort normal and breath sounds normal. No respiratory distress. She has no wheezes. She has no rales. Abdominal: Soft. Bowel sounds are normal. She exhibits no distension and no mass. There is no tenderness. There is no rebound and no guarding. Skin: She is not diaphoretic. Psychiatric:  Very anxious . Eliezer Waikele Pelvic:    External genitalia wnl, no lesions, rashes  Clitoris and urethra midline  Vagina pink, moist, well rugated; a prolapsed, pedunculated fibroid noted about the size of a large grape visualized  coming out of her cervix after removal of clots of blood in the vaginal vault. Unable to visualize cervix due to the large fibroid. Counseling:  Given recurrent HMB/IMB w/ significant drop in hgb in just 24hrs (14.3-->12.7) and continued vb with now a second trip to the ER in 24hrs, I recommend surgical intervention now.     Pt prefers not to have a hysterectomy if at all possible. Discussed how the actual planned surgery would be performed as well as the potential  risks of surgery including bleeding, infection, DVT, risks of surgical injuries to internal organs including but not limited to the bowels, bladder, rectum, and female reproductive organs. The patient understands the risks, any and all questions were answered to the patient's satisfaction.         Assessment/Plan:       56 y.o. [id number] with HMB/IMB, enlarged fibroid uterus w/ pedunculated fibroid     -TXA 1g IV x 1 now  -Megace 60mg now  -posted for EUA, myomectomy, hysteroscopy D&C, possible hysterectomy   -T&C x 2  -awaiting open ER after emergent Janet Edmonds MD

## 2022-06-27 DIAGNOSIS — N85.02 COMPLEX ATYPICAL ENDOMETRIAL HYPERPLASIA: Primary | ICD-10-CM

## 2022-06-27 LAB
ABO + RH BLD: NORMAL
BLD PROD TYP BPU: NORMAL
BLD PROD TYP BPU: NORMAL
BLOOD BANK DISPENSE STATUS: NORMAL
BLOOD BANK DISPENSE STATUS: NORMAL
BLOOD GROUP ANTIBODIES SERPL: NORMAL
BPU ID: NORMAL
BPU ID: NORMAL
CROSSMATCH RESULT: NORMAL
CROSSMATCH RESULT: NORMAL
SPECIMEN EXP DATE BLD: NORMAL
UNIT DIVISION: 0
UNIT DIVISION: 0

## 2022-06-27 NOTE — RESULT ENCOUNTER NOTE
Vaginal myomectomy and hysteroscopy D&C pathology consistent with  FOCAL COMPLEX ATYPICAL HYPERPLASIA INVOLVING FRAGMENTS OF      ENDOMETRIAL TISSUE AND ENDOMETRIAL POLYPS. This gives approx 29% chance of endometrial cancer. I personally called the pt with results and discussed. Referral placed to GYN/ONC Dr Elmo Villalobos for management.

## 2022-06-28 ENCOUNTER — HOSPITAL ENCOUNTER (EMERGENCY)
Age: 47
Discharge: HOME | End: 2022-06-28
Attending: STUDENT IN AN ORGANIZED HEALTH CARE EDUCATION/TRAINING PROGRAM
Payer: COMMERCIAL

## 2022-06-28 ENCOUNTER — NURSE ONLY (OUTPATIENT)
Dept: OBGYN CLINIC | Age: 47
End: 2022-06-28
Payer: COMMERCIAL

## 2022-06-28 VITALS
OXYGEN SATURATION: 96 % | BODY MASS INDEX: 24.39 KG/M2 | WEIGHT: 121 LBS | RESPIRATION RATE: 22 BRPM | SYSTOLIC BLOOD PRESSURE: 124 MMHG | TEMPERATURE: 98.2 F | HEIGHT: 59 IN | DIASTOLIC BLOOD PRESSURE: 87 MMHG | HEART RATE: 95 BPM

## 2022-06-28 DIAGNOSIS — D64.9 SYMPTOMATIC ANEMIA: Primary | ICD-10-CM

## 2022-06-28 DIAGNOSIS — Z98.890 S/P DILATION AND CURETTAGE: ICD-10-CM

## 2022-06-28 DIAGNOSIS — D64.9 ANEMIA, UNSPECIFIED TYPE: Primary | ICD-10-CM

## 2022-06-28 LAB
ABO + RH BLD: NORMAL
ALBUMIN SERPL-MCNC: 3.9 G/DL (ref 3.5–5)
ALBUMIN/GLOB SERPL: 1.1 {RATIO} (ref 1.2–3.5)
ALP SERPL-CCNC: 73 U/L (ref 50–136)
ALT SERPL-CCNC: 20 U/L (ref 12–65)
ANION GAP SERPL CALC-SCNC: 5 MMOL/L (ref 7–16)
AST SERPL-CCNC: 14 U/L (ref 15–37)
BASOPHILS # BLD: 0 K/UL (ref 0–0.2)
BASOPHILS NFR BLD: 1 % (ref 0–2)
BILIRUB SERPL-MCNC: 0.3 MG/DL (ref 0.2–1.1)
BLOOD GROUP ANTIBODIES SERPL: NORMAL
BUN SERPL-MCNC: 8 MG/DL (ref 6–23)
CALCIUM SERPL-MCNC: 8.7 MG/DL (ref 8.3–10.4)
CHLORIDE SERPL-SCNC: 105 MMOL/L (ref 98–107)
CO2 SERPL-SCNC: 28 MMOL/L (ref 21–32)
CREAT SERPL-MCNC: 0.62 MG/DL (ref 0.6–1)
DIFFERENTIAL METHOD BLD: ABNORMAL
EKG ATRIAL RATE: 103 BPM
EKG DIAGNOSIS: NORMAL
EKG P AXIS: 63 DEGREES
EKG P-R INTERVAL: 116 MS
EKG Q-T INTERVAL: 334 MS
EKG QRS DURATION: 82 MS
EKG QTC CALCULATION (BAZETT): 437 MS
EKG R AXIS: 67 DEGREES
EKG T AXIS: 54 DEGREES
EKG VENTRICULAR RATE: 103 BPM
EOSINOPHIL # BLD: 0.1 K/UL (ref 0–0.8)
EOSINOPHIL NFR BLD: 1 % (ref 0.5–7.8)
ERYTHROCYTE [DISTWIDTH] IN BLOOD BY AUTOMATED COUNT: 13.6 % (ref 11.9–14.6)
GLOBULIN SER CALC-MCNC: 3.7 G/DL (ref 2.3–3.5)
GLUCOSE SERPL-MCNC: 109 MG/DL (ref 65–100)
HCT VFR BLD AUTO: 30.6 % (ref 35.8–46.3)
HEMOGLOBIN, POC: 8.1 G/DL
HGB BLD-MCNC: 9.8 G/DL (ref 11.7–15.4)
IMM GRANULOCYTES # BLD AUTO: 0.1 K/UL (ref 0–0.5)
IMM GRANULOCYTES NFR BLD AUTO: 1 % (ref 0–5)
INR PPP: 0.9
LYMPHOCYTES # BLD: 1.6 K/UL (ref 0.5–4.6)
LYMPHOCYTES NFR BLD: 26 % (ref 13–44)
MCH RBC QN AUTO: 26.3 PG (ref 26.1–32.9)
MCHC RBC AUTO-ENTMCNC: 32 G/DL (ref 31.4–35)
MCV RBC AUTO: 82.3 FL (ref 79.6–97.8)
MONOCYTES # BLD: 0.5 K/UL (ref 0.1–1.3)
MONOCYTES NFR BLD: 8 % (ref 4–12)
NEUTS SEG # BLD: 3.8 K/UL (ref 1.7–8.2)
NEUTS SEG NFR BLD: 64 % (ref 43–78)
NRBC # BLD: 0 K/UL (ref 0–0.2)
PLATELET # BLD AUTO: 443 K/UL (ref 150–450)
PMV BLD AUTO: 9.9 FL (ref 9.4–12.3)
POTASSIUM SERPL-SCNC: 3.7 MMOL/L (ref 3.5–5.1)
PROT SERPL-MCNC: 7.6 G/DL (ref 6.3–8.2)
PROTHROMBIN TIME: 12.6 SEC (ref 12.6–14.5)
RBC # BLD AUTO: 3.72 M/UL (ref 4.05–5.2)
SODIUM SERPL-SCNC: 138 MMOL/L (ref 136–145)
SPECIMEN EXP DATE BLD: NORMAL
WBC # BLD AUTO: 5.9 K/UL (ref 4.3–11.1)

## 2022-06-28 PROCEDURE — 85025 COMPLETE CBC W/AUTO DIFF WBC: CPT

## 2022-06-28 PROCEDURE — 96360 HYDRATION IV INFUSION INIT: CPT

## 2022-06-28 PROCEDURE — 85018 HEMOGLOBIN: CPT | Performed by: OBSTETRICS & GYNECOLOGY

## 2022-06-28 PROCEDURE — 85610 PROTHROMBIN TIME: CPT

## 2022-06-28 PROCEDURE — 80053 COMPREHEN METABOLIC PANEL: CPT

## 2022-06-28 PROCEDURE — 86901 BLOOD TYPING SEROLOGIC RH(D): CPT

## 2022-06-28 PROCEDURE — 99284 EMERGENCY DEPT VISIT MOD MDM: CPT

## 2022-06-28 PROCEDURE — 2580000003 HC RX 258: Performed by: PHYSICIAN ASSISTANT

## 2022-06-28 RX ORDER — SODIUM CHLORIDE 0.9 % (FLUSH) 0.9 %
3 SYRINGE (ML) INJECTION EVERY 8 HOURS
Status: DISCONTINUED | OUTPATIENT
Start: 2022-06-28 | End: 2022-06-28 | Stop reason: HOSPADM

## 2022-06-28 RX ORDER — 0.9 % SODIUM CHLORIDE 0.9 %
500 INTRAVENOUS SOLUTION INTRAVENOUS
Status: COMPLETED | OUTPATIENT
Start: 2022-06-28 | End: 2022-06-28

## 2022-06-28 RX ADMIN — SODIUM CHLORIDE 500 ML: 9 INJECTION, SOLUTION INTRAVENOUS at 13:29

## 2022-06-28 ASSESSMENT — ENCOUNTER SYMPTOMS
ABDOMINAL PAIN: 0
COLOR CHANGE: 0
SHORTNESS OF BREATH: 0
VOMITING: 0
NAUSEA: 0
BACK PAIN: 0

## 2022-06-28 ASSESSMENT — PAIN - FUNCTIONAL ASSESSMENT: PAIN_FUNCTIONAL_ASSESSMENT: NONE - DENIES PAIN

## 2022-06-28 NOTE — PROGRESS NOTES
Pt presents for labs. Pt is s/p VAGINAL MYOMECTOMY, HYSTEROSCOPY, DILATION AND CURETTAGE, MULTIPLE POLYPECTOMY on 6/24/22 w/ . During blood draw pt states she felt \"weird\" and thought she was going to pass out. BP- 150/92, POC Hgb 8.1. [place] juice given to pt. Pt reports increase in bright red vaginal bleeding with wiping today. Pt c/o stomach pain and increased heart rate for the past couple of days. Pt reports starting PO liquid iron yesterday. Reviewed above information with . Per Patriot Sheets for pt to go directly to [facility] ED for evaluation,stat labs and possible blood transfusion. Pt notified of Michel Meneses recommendations. Pt voiced understanding. Pt's condition improved enough for her to leave to go to ED. Pt's daughter is with her to drive her. Labs drawn in office will be discarded.

## 2022-06-28 NOTE — ED PROVIDER NOTES
[facility] Emergency Department Provider Note                   PCP:                Mohan Walker MD               Age: 56 y.o. Sex: female       ICD-10-CM    1. Anemia, unspecified type  D64.9    2. S/P dilation and curettage  Z98.890        DISPOSITION Decision To Discharge 06/28/2022 01:57:27 PM       Discharge Medication List as of 6/28/2022  2:23 PM      START taking these medications    Details   norethindrone (AYGESTIN) 5 MG tablet Take 1 tablet by mouth in the morning and at bedtime, Disp-60 tablet, R-0Print             Orders Placed This Encounter   Procedures    CBC with Auto Differential    CMP    Protime-INR    Pulse Oximetry    Cardiac Monitor - ED Only    EKG 12 Lead (Select if Upper Abdominal Pain or symptoms of SOB,or Tachycardia)    TYPE AND SCREEN    Saline lock IV        BRENTON JENIFER EASON 3:38 PM      MDM  Number of Diagnoses or Management Options  Anemia, unspecified type  S/P dilation and curettage  Diagnosis management comments: In summary this is a well-appearing 52-year-old female presenting today for fatigue, lightheadedness, and heart palpitations. She had D&C performed 4 days ago for heavy menstrual bleeding and symptomatic anemia. Overall patient is nontoxic in appearance, vital signs are stable other than mild tachycardia. She is having no current abdominal pain or bleeding. Her hemoglobin level today is 9.8, one-point drop from 4 days ago. Discussed patient with Dr. Hema Grossman who is on-call and actually performed patient's procedure per days ago. She recommended an iron infusion here in the ER, oral iron and vitamin C supplementation outpatient, and prescription for Aygestin twice daily and will have patient follow-up in the office as scheduled. I discussed the plan with the patient, she declined to the iron infusion here today. She understands and accepts these risks, will plan follow up outpatient.        Amount and/or Complexity of Data Reviewed  Clinical lab tests: ordered and reviewed  Tests in the radiology section of CPT®: ordered and reviewed  Discuss the patient with other providers: yes (Dr. Rogerio Naik ; Dr. Jossue Lopez)    Patient Progress  Patient progress: improved       Diana Gavin is a 56 y.o. female who presents to the Emergency Department with chief complaint of    Chief Complaint   Patient presents with    Post-op Problem      43-year-old well-appearing female presents today for evaluation of generalized fatigue, lightheadedness, and heart racing over the past 2 days. Patient was seen here 5 days ago for heavy menstrual bleeding and was noted to have a two-point hemoglobin drop and had emergency D&C performed here with Dr. Jossue Lopez. She was discharged home. She states that from a surgical standpoint she is feeling well. She reports a very mild intermittent pelvic cramping but denies any sharp abdominal pain. She states she has noticed a small amount of bright red spotting only when wiping after urinating but has not passed any large clots and is not even feeling a pad with her vaginal bleeding. She denies any dysuria, urinary frequency, nausea, vomiting or fevers. She states that she called her OB/GYN office today and went in to have labs performed, they performed a point-of-care hemoglobin that was apparently 8. She was sent here for further evaluation. Review of Systems   Constitutional: Positive for fatigue. Negative for activity change and appetite change. HENT: Negative for nosebleeds. Respiratory: Negative for shortness of breath. Cardiovascular: Negative for chest pain. \"heart racing\"   Gastrointestinal: Negative for abdominal pain, nausea and vomiting. Genitourinary: Negative for dysuria and frequency. \"light spotting\"   Musculoskeletal: Negative for back pain. Skin: Negative for color change and pallor. Allergic/Immunologic: Negative for immunocompromised state.    Neurological: Positive for light-headedness and headaches. Hematological: Does not bruise/bleed easily. Psychiatric/Behavioral:        Anxiety       Past Medical History:   Diagnosis Date    Anemia     Complex atypical endometrial hyperplasia     Fibroid, uterine     Prolonged emergence from general anesthesia         Past Surgical History:   Procedure Laterality Date     SECTION  2155,8323    x2    DILATION AND CURETTAGE OF UTERUS      LAP,TUBAL CAUTERY      LAPAROSCOPY N/A 2022    EXAM UNDER ANESTHESIA, VAGINAL MYOMECTOMY, HYSTEROSCOPY, DILATION AND CURETTAGE, MULTIPLE POLYPECTOMY performed [facility] at [facility]        Family History   Problem Relation Age of Onset    Cancer Sister         cervical    Ovarian Cancer Neg Hx     Colon Cancer Neg Hx     Breast Cancer Neg Hx            Social Connections:     Frequency of Communication with Friends and Family: Not on file    Frequency of Social Gatherings with Friends and Family: Not on file    Attends Rastafari Services: Not on file    Active Member of Clubs or Organizations: Not on file    Attends Club or Organization Meetings: Not on file    Marital Status: Not on file        Allergies   Allergen Reactions    Amoxicillin Itching and Rash    Morphine Nausea And Vomiting        Vitals signs and nursing note reviewed. Patient Vitals for the past 4 hrs:   Temp Pulse Resp BP SpO2   22 1434 -- 95 22 124/87 96 %   22 1429 -- 91 -- 124/75 99 %   22 1400 -- 98 -- -- 100 %   22 1259 -- (!) 109 24 118/84 97 %   22 1222 98.2 °F (36.8 °C) (!) 114 20 (!) 145/92 99 %          Physical Exam  Vitals and nursing note reviewed. Constitutional:       General: She is not in acute distress. Appearance: Normal appearance. HENT:      Head: Normocephalic and atraumatic. Mouth/Throat:      Mouth: Mucous membranes are moist.      Pharynx: Oropharynx is clear.  No oropharyngeal exudate or posterior oropharyngeal erythema. Eyes:      Conjunctiva/sclera: Conjunctivae normal.      Pupils: Pupils are equal, round, and reactive to light. Cardiovascular:      Rate and Rhythm: Normal rate and regular rhythm. Heart sounds: No murmur heard. No friction rub. No gallop. Pulmonary:      Effort: Pulmonary effort is normal.      Breath sounds: No wheezing, rhonchi or rales. Abdominal:      Palpations: Abdomen is soft. Tenderness: There is no abdominal tenderness. There is no guarding or rebound. Skin:     General: Skin is warm and dry. Capillary Refill: Capillary refill takes less than 2 seconds. Neurological:      General: No focal deficit present. Mental Status: She is alert and oriented to person, place, and time. Psychiatric:         Mood and Affect: Mood normal.         Thought Content: Thought content normal.         Judgment: Judgment normal.          Procedures    ED EKG Interpretation  EKG was interpreted in the absence of a cardiologist.    Rate: Rate: Tachycardia  EKG Interpretation: EKG Interpretation: sinus rhythm  ST Segments: Normal ST segments - NO STEMI    Labs Reviewed   CBC WITH AUTO DIFFERENTIAL - Abnormal; Notable for the following components:       Result Value    RBC 3.72 (*)     Hemoglobin 9.8 (*)     Hematocrit 30.6 (*)     All other components within normal limits   COMPREHENSIVE METABOLIC PANEL - Abnormal; Notable for the following components:    Anion Gap 5 (*)     Glucose 109 (*)     AST 14 (*)     Globulin 3.7 (*)     Albumin/Globulin Ratio 1.1 (*)     All other components within normal limits   PROTIME-INR   TYPE AND SCREEN        No orders to display                      ED Course as of 06/28/22 1538   Tue Jun 28, 2022   1317 EKG: Sinus tachycardia 103 bpm.  No ST changes of ischemia. No STEMI.   [KE]   6078 Discussed the case with Dr. Shankar Dupont, she recommended iron infusion here in the ER, prescription for Aygestin at home, oral iron and vitamin C.   I did place an order for shavonne however after discussing with patient she declines the iron infusion treatment. She understands and accepts the risks of delayed treatment and would like to just take her oral iron at home. [KE]      ED Course User Index  [DC] JENIFER Ralph        Voice dictation software was used during the making of this note. This software is not perfect and grammatical and other typographical errors may be present. This note has not been completely proofread for errors.      Angie Ralph  06/28/22 1539

## 2022-06-28 NOTE — ED TRIAGE NOTES
Pt states she had emergency surgery for a bleeding fibroid on Friday and hemoglobin was 8 at follow up appointment this morning so sent to the ER. States she has been feeling worse since Saturday and has been anemic in the past. Masked for triage. Yes...

## 2022-06-30 DIAGNOSIS — N85.02 COMPLEX ATYPICAL ENDOMETRIAL HYPERPLASIA: Primary | ICD-10-CM

## 2022-07-04 NOTE — ANESTHESIA POSTPROCEDURE EVALUATION
Department of Anesthesiology  Postprocedure Note    Patient: Viet Guerra  MRN: [id number]  YOB: 1975  Date of evaluation: 7/4/2022      Procedure Summary     Date: 06/24/22 [place] [facility] [place]  [place] [facility] [place] [place]    Anesthesia Start: 0015 Anesthesia Stop: 0112    Procedure: EXAM UNDER ANESTHESIA, VAGINAL MYOMECTOMY, HYSTEROSCOPY, DILATION AND CURETTAGE, MULTIPLE POLYPECTOMY (N/A Abdomen) Diagnosis:       Menorrhagia with regular cycle      (Menorrhagia with regular cycle [N92.0])    Surgeons: Too Muñoz MD Responsible Provider: Leslie Chaudhari MD    Anesthesia Type: General ASA Status: 2 - Emergent          Anesthesia Type: General    Malgorzata Phase I: Malgorzata Score: 10    Malgorzata Phase II:        Anesthesia Post Evaluation    Patient location during evaluation: PACU  Patient participation: complete - patient participated  Level of consciousness: awake and alert  Airway patency: patent  Nausea & Vomiting: no nausea  Cardiovascular status: hemodynamically stable  Respiratory status: acceptable  Hydration status: euvolemic

## 2022-07-05 ENCOUNTER — TELEPHONE (OUTPATIENT)
Dept: OBGYN CLINIC | Age: 47
End: 2022-07-05

## 2022-07-05 RX ORDER — ERGOCALCIFEROL 1.25 MG/1
50000 CAPSULE ORAL WEEKLY
Qty: 6 CAPSULE | Refills: 0 | Status: SHIPPED | OUTPATIENT
Start: 2022-07-05

## 2022-07-05 NOTE — TELEPHONE ENCOUNTER
Pt called to schedule post op appointment with . Pt is scheduled to see Rashaad Ricks on 7/6/22 at [facility]c. Per Brisa Jasso ok to follow up with Rashaad Ricks, no appointment here is needed at this time. Pt instructed to keep appointment tomorrow. Pt voiced understanding.

## 2022-07-05 NOTE — PROGRESS NOTES
Date: 2022        Patient Name: Doug Ta     YOB: 1975          Chief Complaint   No chief complaint on file.  endometrial hyperplasia, atypical    In prolapsed polyp/fibroids         History of Present Illness   Doug Ta is a 56 y.o. who presents today for     Past Medical History     Past Medical History:   Diagnosis Date    Anemia     Complex atypical endometrial hyperplasia     Fibroid, uterine     Prolonged emergence from general anesthesia         Past Surgical History     Past Surgical History:   Procedure Laterality Date     SECTION  [id number]    x2    DILATION AND CURETTAGE OF UTERUS      LAP,TUBAL CAUTERY      LAPAROSCOPY N/A 2022    EXAM UNDER ANESTHESIA, VAGINAL MYOMECTOMY, HYSTEROSCOPY, DILATION AND CURETTAGE, MULTIPLE POLYPECTOMY performed by Aleah Leblanc [facility] at [facility]        Medications      Current Outpatient Medications:     vitamin D (ERGOCALCIFEROL) 1.25 MG (52373 UT) CAPS capsule, Take 1 capsule by mouth once a week, Disp: 6 capsule, Rfl: 0    ibuprofen (ADVIL;MOTRIN) 800 MG tablet, Take 1 tablet by mouth every 6 hours as needed for Pain, Disp: 90 tablet, Rfl: 0    ferrous sulfate 220 (44 Fe) MG/5ML solution, Take by mouth daily , Disp: , Rfl:     norethindrone (AYGESTIN) 5 MG tablet, Take 1 tablet by mouth in the morning and at bedtime (Patient not taking: Reported on 2022), Disp: 60 tablet, Rfl: 0    tranexamic acid (LYSTEDA) 650 MG TABS tablet, Take 2 tablets by mouth 3 times daily as needed (Heavy Menses) Take only on heavy days, Disp: 30 tablet, Rfl: 12    LORazepam (ATIVAN) 0.5 MG tablet, Take 1 tab twice a day as needed for anxiety.  (Patient not taking: Reported on 2022), Disp: , Rfl:      Allergies   Amoxicillin and Morphine    Social History     Social History     Tobacco History     Smoking Status  Former Smoker    Smokeless Tobacco Use  Never Used    Tobacco Comment  Quit smoking: at age 22, social          Alcohol History     Alcohol Use Status  Not Currently          Drug Use     Drug Use Status  Never          Sexual Activity     Sexually Active  Yes Partners  Male Birth Control/Protection  Surgical Comment  tubal                Family History     Family History   Problem Relation Age of Onset    Cancer Sister         cervical    Ovarian Cancer Neg Hx     Colon Cancer Neg Hx     Breast Cancer Neg Hx        Review of Systems   Review of Systems   Constitutional: Negative. HENT: Negative. Eyes: Negative. Respiratory: Negative. Cardiovascular: Negative. Gastrointestinal: Negative. Endocrine: Negative. Genitourinary: Positive for menstrual problem. Musculoskeletal: Negative. Allergic/Immunologic: Negative. Neurological: Negative. Hematological: Negative. Psychiatric/Behavioral: Negative. All other systems reviewed and are negative. Physical Exam     ECOG PERFORMANCE STATUS - 0-Fully active, able to carry on all pre-disease performance without restriction. Blood pressure (!) 131/93, pulse (!) 122, temperature 98.8 °F (37.1 °C), temperature source Oral, resp. rate 18, height 4' 11\" (1.499 m), weight 123 lb (55.8 kg), SpO2 99 %. Physical Exam  Vitals and nursing note reviewed. Exam conducted with a chaperone present. Constitutional:       Appearance: Normal appearance. HENT:      Head: Normocephalic and atraumatic. Nose: No congestion. Cardiovascular:      Rate and Rhythm: Normal rate and regular rhythm. Heart sounds: Normal heart sounds. Pulmonary:      Effort: Pulmonary effort is normal. No respiratory distress. Breath sounds: Normal breath sounds. Abdominal:      General: Abdomen is flat. Palpations: Abdomen is soft. Musculoskeletal:         General: No swelling. Normal range of motion. Skin:     General: Skin is warm and dry. Neurological:      General: No focal deficit present.       Mental Status: She is alert and oriented to person, place, and time. Psychiatric:         Mood and Affect: Mood normal.         Behavior: Behavior normal.         Thought Content: Thought content normal.         Judgment: Judgment normal.         Labs        No results found for this or any previous visit (from the past 48 hour(s)). No results found for:      Pathology      Name: Selena Nielsen   Accession Number:   [id number]   MR #   [id number]   Date Obtained:   6/24/2022   DIAGNOSIS        A:  \"PEDUNCULATED PROLAPSED FIBROID\":             LEIOMYOMA (4.9 CM IN GREATEST DIMENSION).           ENDOMETRIUM WITH FOCAL COMPLEX ATYPICAL HYPERPLASIA.      B:  \"UTERINE POLYPS\":               FOCAL COMPLEX ATYPICAL HYPERPLASIA INVOLVING FRAGMENTS OF                      ENDOMETRIAL TISSUE AND ENDOMETRIAL POLYPS. Sign Out Date: 6/27/2022 Bill Mendez MD   Imaging/Diagnostics Last 24 Hours   No results found. Radiology:    CT Result (most recent):  No results found for this or any previous visit from the past 3650 days. PET Results (most recent):  No results found for this or any previous visit from the past 365 days. Mammo Results (most recent):  No results found for this or any previous visit from the past 365 days. US Result (most recent):  No results found for this or any previous visit from the past 3650 days. Assessment       Diagnosis Orders   1. Iron deficiency anemia due to chronic blood loss       Specialty Problems     None          Plan   1. Reviewed bx, recommended defnitive surgery, pt agreed  With 2 prior c/s, will plan eugenia/bs/staging with prior pfannenstiel revision   Robotic offered/discussed    Pt requests ovary conservation, rba reviewed    I reviewed R/B/A.   Risks include but are not limited to complications with anesthesia, drug reactions, infection, risk for bleeding requiring blood transfusion, damage to internal organs (bowel, bladder, ureters, kidneys, nerves, arteries, veins) requiring additional surgery, blood clots, stroke, respiratory problems            Shagufta Mcgee MD  [facility]  [address] [address]  Office : [phone]  Fax : [phone]

## 2022-07-06 ENCOUNTER — OFFICE VISIT (OUTPATIENT)
Dept: ONCOLOGY | Age: 47
End: 2022-07-06
Payer: COMMERCIAL

## 2022-07-06 VITALS
RESPIRATION RATE: 18 BRPM | BODY MASS INDEX: 24.8 KG/M2 | OXYGEN SATURATION: 99 % | HEIGHT: 59 IN | WEIGHT: 123 LBS | DIASTOLIC BLOOD PRESSURE: 93 MMHG | HEART RATE: 122 BPM | SYSTOLIC BLOOD PRESSURE: 131 MMHG | TEMPERATURE: 98.8 F

## 2022-07-06 DIAGNOSIS — D50.0 IRON DEFICIENCY ANEMIA DUE TO CHRONIC BLOOD LOSS: Primary | ICD-10-CM

## 2022-07-06 PROCEDURE — 99204 OFFICE O/P NEW MOD 45 MIN: CPT | Performed by: OBSTETRICS & GYNECOLOGY

## 2022-07-06 ASSESSMENT — ENCOUNTER SYMPTOMS
EYES NEGATIVE: 1
ALLERGIC/IMMUNOLOGIC NEGATIVE: 1
RESPIRATORY NEGATIVE: 1
GASTROINTESTINAL NEGATIVE: 1

## 2022-07-06 ASSESSMENT — PATIENT HEALTH QUESTIONNAIRE - PHQ9
SUM OF ALL RESPONSES TO PHQ QUESTIONS 1-9: 2
1. LITTLE INTEREST OR PLEASURE IN DOING THINGS: 1
SUM OF ALL RESPONSES TO PHQ QUESTIONS 1-9: 2
SUM OF ALL RESPONSES TO PHQ QUESTIONS 1-9: 2
2. FEELING DOWN, DEPRESSED OR HOPELESS: 1
SUM OF ALL RESPONSES TO PHQ9 QUESTIONS 1 & 2: 2
SUM OF ALL RESPONSES TO PHQ QUESTIONS 1-9: 2

## 2022-07-06 NOTE — PATIENT INSTRUCTIONS
Patient Instructions from Today's Visit    Reason for Visit:  New Patient    Diagnosis Information:  [email or web address]      Plan: We recommend hysterectomy      Follow Up: After surgery    Recent Lab Results: n/a      Treatment Summary has been discussed and given to patient: n/a        -------------------------------------------------------------------------------------------------------------------     Patient does express an interest in My Chart. My Chart log in information explained on the after visit summary printout at the [facility] [address] desk.     Kate Rao, RN, MSN, OCN  Gynecology Nurse Navigator for Dr. Sho Gu  [address] [place] [address]  Phone:  [phone]  Fax: [phone]  Email: [email or web address]

## 2022-07-07 ENCOUNTER — TELEPHONE (OUTPATIENT)
Dept: ONCOLOGY | Age: 47
End: 2022-07-07

## 2022-07-07 DIAGNOSIS — D50.0 IRON DEFICIENCY ANEMIA DUE TO CHRONIC BLOOD LOSS: Primary | ICD-10-CM

## 2022-07-07 NOTE — TELEPHONE ENCOUNTER
Called patient and informed her that her insurance is out of network and the surgery with Dr. Manjit Rahman would not be covered. Referral sent to Dr. Sherrell Elkins with [facility].

## 2022-07-07 NOTE — TELEPHONE ENCOUNTER
----- Message from Naseem Conway RN sent at 7/6/2022  2:54 PM EDT -----  Regarding: Lisa Anderson surgery  Hysterectomy see los

## 2022-07-08 ENCOUNTER — TELEPHONE (OUTPATIENT)
Dept: CASE MANAGEMENT | Age: 47
End: 2022-07-08

## 2022-07-08 NOTE — TELEPHONE ENCOUNTER
While obtaining prior auth for surgery- insurance requires her to go to [facility]. Referral placed as urgent. Dr Nora Rodriguez called and spoke to Dr Anish Gee about case. I spoke to patient and she is aware.  She knows if she has not heard from Dr Sun Johnson office by Monday afternoon to let me know

## 2022-08-30 NOTE — TELEPHONE ENCOUNTER
Pt called requesting ER follow up appointment. Pt went to ER last night for severe heavy vaginal bleeding. ER found prolapsed uterine fibroid emerging from the cervix. Pt told to follow up in our office. Worked pt in with Dr. Glennon Severe this afternoon for follow up. Pt verbalized understanding, has no further questions.
unchanged

## 2024-10-24 ENCOUNTER — OFFICE VISIT (OUTPATIENT)
Dept: OBGYN CLINIC | Age: 49
End: 2024-10-24
Payer: COMMERCIAL

## 2024-10-24 VITALS — SYSTOLIC BLOOD PRESSURE: 120 MMHG | DIASTOLIC BLOOD PRESSURE: 68 MMHG

## 2024-10-24 DIAGNOSIS — R23.2 HOT FLASHES: ICD-10-CM

## 2024-10-24 DIAGNOSIS — R53.83 OTHER FATIGUE: Primary | ICD-10-CM

## 2024-10-24 DIAGNOSIS — I10 HYPERTENSION, UNSPECIFIED TYPE: ICD-10-CM

## 2024-10-24 LAB
25(OH)D3 SERPL-MCNC: 20.9 NG/ML (ref 30–100)
ALBUMIN SERPL-MCNC: 3.9 G/DL (ref 3.5–5)
ALBUMIN/GLOB SERPL: 1.1 (ref 1–1.9)
ALP SERPL-CCNC: 90 U/L (ref 35–104)
ALT SERPL-CCNC: 21 U/L (ref 8–45)
ANION GAP SERPL CALC-SCNC: 10 MMOL/L (ref 9–18)
AST SERPL-CCNC: 23 U/L (ref 15–37)
BASOPHILS # BLD: 0 K/UL (ref 0–0.2)
BASOPHILS NFR BLD: 1 % (ref 0–2)
BILIRUB SERPL-MCNC: 0.3 MG/DL (ref 0–1.2)
BUN SERPL-MCNC: 7 MG/DL (ref 6–23)
CALCIUM SERPL-MCNC: 9 MG/DL (ref 8.8–10.2)
CHLORIDE SERPL-SCNC: 104 MMOL/L (ref 98–107)
CO2 SERPL-SCNC: 24 MMOL/L (ref 20–28)
CREAT SERPL-MCNC: 0.71 MG/DL (ref 0.6–1.1)
DIFFERENTIAL METHOD BLD: ABNORMAL
EOSINOPHIL # BLD: 0.1 K/UL (ref 0–0.8)
EOSINOPHIL NFR BLD: 3 % (ref 0.5–7.8)
ERYTHROCYTE [DISTWIDTH] IN BLOOD BY AUTOMATED COUNT: 20.2 % (ref 11.9–14.6)
ESTRADIOL SERPL-MCNC: 182 PG/ML
FSH SERPL-ACNC: 3.2 MIU/ML
GLOBULIN SER CALC-MCNC: 3.4 G/DL (ref 2.3–3.5)
GLUCOSE SERPL-MCNC: 101 MG/DL (ref 70–99)
HCT VFR BLD AUTO: 44.4 % (ref 35.8–46.3)
HGB BLD-MCNC: 13.4 G/DL (ref 11.7–15.4)
IMM GRANULOCYTES # BLD AUTO: 0 K/UL (ref 0–0.5)
IMM GRANULOCYTES NFR BLD AUTO: 0 % (ref 0–5)
LH SERPL-ACNC: 4 MIU/ML
LYMPHOCYTES # BLD: 1.6 K/UL (ref 0.5–4.6)
LYMPHOCYTES NFR BLD: 35 % (ref 13–44)
MCH RBC QN AUTO: 24.2 PG (ref 26.1–32.9)
MCHC RBC AUTO-ENTMCNC: 30.2 G/DL (ref 31.4–35)
MCV RBC AUTO: 80.3 FL (ref 82–102)
MONOCYTES # BLD: 0.4 K/UL (ref 0.1–1.3)
MONOCYTES NFR BLD: 8 % (ref 4–12)
NEUTS SEG # BLD: 2.5 K/UL (ref 1.7–8.2)
NEUTS SEG NFR BLD: 54 % (ref 43–78)
NRBC # BLD: 0 K/UL (ref 0–0.2)
PLATELET # BLD AUTO: 280 K/UL (ref 150–450)
PMV BLD AUTO: 10.4 FL (ref 9.4–12.3)
POTASSIUM SERPL-SCNC: 4.2 MMOL/L (ref 3.5–5.1)
PROT SERPL-MCNC: 7.3 G/DL (ref 6.3–8.2)
RBC # BLD AUTO: 5.53 M/UL (ref 4.05–5.2)
SODIUM SERPL-SCNC: 138 MMOL/L (ref 136–145)
VIT B12 SERPL-MCNC: 299 PG/ML (ref 193–986)
WBC # BLD AUTO: 4.6 K/UL (ref 4.3–11.1)

## 2024-10-24 PROCEDURE — 3078F DIAST BP <80 MM HG: CPT | Performed by: OBSTETRICS & GYNECOLOGY

## 2024-10-24 PROCEDURE — 99203 OFFICE O/P NEW LOW 30 MIN: CPT | Performed by: OBSTETRICS & GYNECOLOGY

## 2024-10-24 PROCEDURE — 3074F SYST BP LT 130 MM HG: CPT | Performed by: OBSTETRICS & GYNECOLOGY

## 2024-10-24 ASSESSMENT — ENCOUNTER SYMPTOMS
GASTROINTESTINAL NEGATIVE: 1
ALLERGIC/IMMUNOLOGIC NEGATIVE: 1
EYES NEGATIVE: 1
RESPIRATORY NEGATIVE: 1

## 2024-10-24 NOTE — PROGRESS NOTES
Name: Brittany Wei  Date: 10/24/2024  YOB: 1975  LMP: No LMP recorded. (Menstrual status: Irregular periods).      Brittany Sánchez is a 49 y.o.   who is here for a problem visit for hormone problems . She has had a complicated 2 years.  First a prolapsed fibroid, then heavy continuous bleeding with complex atypia and hyst. During this time moved to [facility] then [place] again.     Brittany Sánchez  reports being sexually active and has had partner(s) who are male. She reports using the following method of birth control/protection: Surgical.  Contraception: status post hysterectomy.   Menstrual status: none  Gyn Surgery:  s/p hyst      Women's Health Timeline:  No specialty comments available.      Pap History:  Diagnosis:   Date Value Ref Range Status   2021 Comment  Final     Comment:     NEGATIVE FOR INTRAEPITHELIAL LESION OR MALIGNANCY.        OB History:  OB History          4    Para   3    Term   3            AB   1    Living   2         SAB   1    IAB        Ectopic        Molar        Multiple        Live Births   2                 Medical History:  Past Medical History:  No date: Anemia  No date: Complex atypical endometrial hyperplasia  No date: Fibroid, uterine  No date: Prolonged emergence from general anesthesia     Surgical History:  Past Surgical History:  ,:  SECTION      Comment:  x2  : DILATION AND CURETTAGE OF UTERUS  No date: LAP,TUBAL CAUTERY  2022: LAPAROSCOPY; N/A      Comment:  EXAM UNDER ANESTHESIA, VAGINAL MYOMECTOMY, HYSTEROSCOPY,               DILATION AND CURETTAGE, MULTIPLE POLYPECTOMY performed by               Cherry Light [place] [facility]     Meds:    Current Outpatient Medications:     butalbital-acetaminophen-caffeine (FIORICET, ESGIC) -40 MG per tablet, Take 1 tablet by mouth every 6 hours as needed for Headaches or Migraine, Disp: 12 tablet, Rfl: 0    vitamin D (ERGOCALCIFEROL)

## 2024-11-08 RX ORDER — ERGOCALCIFEROL 1.25 MG/1
50000 CAPSULE, LIQUID FILLED ORAL WEEKLY
Qty: 12 CAPSULE | Refills: 1 | Status: CANCELLED | OUTPATIENT
Start: 2024-11-08

## 2024-11-08 RX ORDER — ERGOCALCIFEROL 1.25 MG/1
50000 CAPSULE, LIQUID FILLED ORAL WEEKLY
Qty: 12 CAPSULE | Refills: 1 | Status: SHIPPED | OUTPATIENT
Start: 2024-11-08

## 2025-01-23 ENCOUNTER — OFFICE VISIT (OUTPATIENT)
Dept: OBGYN CLINIC | Age: 50
End: 2025-01-23
Payer: COMMERCIAL

## 2025-01-23 VITALS
WEIGHT: 131 LBS | DIASTOLIC BLOOD PRESSURE: 90 MMHG | HEIGHT: 59 IN | SYSTOLIC BLOOD PRESSURE: 140 MMHG | BODY MASS INDEX: 26.41 KG/M2

## 2025-01-23 DIAGNOSIS — Z12.31 ENCOUNTER FOR SCREENING MAMMOGRAM FOR MALIGNANT NEOPLASM OF BREAST: ICD-10-CM

## 2025-01-23 DIAGNOSIS — Z01.419 WELL WOMAN EXAM WITH ROUTINE GYNECOLOGICAL EXAM: Primary | ICD-10-CM

## 2025-01-23 DIAGNOSIS — Z90.710 SCREENING FOR MALIGNANT NEOPLASM OF VAGINA AFTER TOTAL HYSTERECTOMY: ICD-10-CM

## 2025-01-23 DIAGNOSIS — Z11.51 SCREENING FOR HPV (HUMAN PAPILLOMAVIRUS): ICD-10-CM

## 2025-01-23 DIAGNOSIS — Z12.72 SCREENING FOR MALIGNANT NEOPLASM OF VAGINA AFTER TOTAL HYSTERECTOMY: ICD-10-CM

## 2025-01-23 PROCEDURE — 99459 PELVIC EXAMINATION: CPT | Performed by: OBSTETRICS & GYNECOLOGY

## 2025-01-23 PROCEDURE — 99396 PREV VISIT EST AGE 40-64: CPT | Performed by: OBSTETRICS & GYNECOLOGY

## 2025-01-23 RX ORDER — LOSARTAN POTASSIUM 50 MG/1
50 TABLET ORAL DAILY
COMMUNITY
Start: 2024-12-18 | End: 2025-12-18

## 2025-01-23 SDOH — ECONOMIC STABILITY: INCOME INSECURITY: IN THE LAST 12 MONTHS, WAS THERE A TIME WHEN YOU WERE NOT ABLE TO PAY THE MORTGAGE OR RENT ON TIME?: NO

## 2025-01-23 SDOH — ECONOMIC STABILITY: FOOD INSECURITY: WITHIN THE PAST 12 MONTHS, THE FOOD YOU BOUGHT JUST DIDN'T LAST AND YOU DIDN'T HAVE MONEY TO GET MORE.: NEVER TRUE

## 2025-01-23 SDOH — ECONOMIC STABILITY: FOOD INSECURITY: WITHIN THE PAST 12 MONTHS, YOU WORRIED THAT YOUR FOOD WOULD RUN OUT BEFORE YOU GOT MONEY TO BUY MORE.: NEVER TRUE

## 2025-01-23 SDOH — ECONOMIC STABILITY: TRANSPORTATION INSECURITY
IN THE PAST 12 MONTHS, HAS LACK OF TRANSPORTATION KEPT YOU FROM MEETINGS, WORK, OR FROM GETTING THINGS NEEDED FOR DAILY LIVING?: NO

## 2025-01-23 SDOH — ECONOMIC STABILITY: TRANSPORTATION INSECURITY
IN THE PAST 12 MONTHS, HAS THE LACK OF TRANSPORTATION KEPT YOU FROM MEDICAL APPOINTMENTS OR FROM GETTING MEDICATIONS?: NO

## 2025-01-23 ASSESSMENT — ENCOUNTER SYMPTOMS
GASTROINTESTINAL NEGATIVE: 1
EYES NEGATIVE: 1
ALLERGIC/IMMUNOLOGIC NEGATIVE: 1
RESPIRATORY NEGATIVE: 1

## 2025-01-23 NOTE — PROGRESS NOTES
Name: Brittany Wei  Date: 2025  YOB: 1975  LMP: Patient's last menstrual period was 2024.      Brittany Sánchez is a 49 y.o.   who is here for a annual exam. Struggling with depression. Not wanting to take her htn meds.     Brittany Sánchez  reports being sexually active and has had partner(s) who are male. She reports using the following method of birth control/protection: Surgical.  Contraception: status post hysterectomy.   Menstrual status: none  Gyn Surgery:  s/p hyst      Women's Health Timeline:  10/24: robotic hyst  due to complex atypical hyperplasia      Pap History:  Diagnosis:   Date Value Ref Range Status   2021 Comment  Final     Comment:     NEGATIVE FOR INTRAEPITHELIAL LESION OR MALIGNANCY.        OB History:  OB History          4    Para   3    Term   3            AB   1    Living   2         SAB   1    IAB        Ectopic        Molar        Multiple        Live Births   2                 Medical History:  Past Medical History:  No date: Anemia  No date: Complex atypical endometrial hyperplasia  No date: Fibroid, uterine  2024: History of blood transfusion  No date: Menopausal symptoms  No date: Migraine  No date: Prolonged emergence from general anesthesia     Surgical History:  Past Surgical History:  ,:  SECTION      Comment:  x2  : DILATION AND CURETTAGE OF UTERUS  2024: HYSTERECTOMY (CERVIX STATUS UNKNOWN)  2022: HYSTEROSCOPY  No date: LAP,TUBAL CAUTERY  2022: LAPAROSCOPY; N/A      Comment:  EXAM UNDER ANESTHESIA, VAGINAL MYOMECTOMY, HYSTEROSCOPY,               DILATION AND CURETTAGE, MULTIPLE POLYPECTOMY performed by               [place] [facility] [place]  2022: MYOMECTOMY  06/10/2001: TUBAL LIGATION     Meds:    Current Outpatient Medications:     vitamin D (ERGOCALCIFEROL) 1.25 MG (71644 UT) CAPS capsule, Take 1 capsule by mouth once a week, Disp: 12 capsule, Rfl: 1

## 2025-01-28 LAB
COLLECTION METHOD: NORMAL
CYTOLOGIST CVX/VAG CYTO: NORMAL
CYTOLOGY CVX/VAG DOC THIN PREP: NORMAL
DATE OF LMP: 0
HPV APTIMA: NEGATIVE
HPV GENOTYPE REFLEX: NORMAL
Lab: NORMAL
PAP SOURCE: NORMAL
PATH REPORT.FINAL DX SPEC: NORMAL
STAT OF ADQ CVX/VAG CYTO-IMP: NORMAL

## 2025-02-04 ENCOUNTER — HOSPITAL ENCOUNTER (EMERGENCY)
Age: 50
Discharge: HOME | End: 2025-02-04
Payer: COMMERCIAL

## 2025-02-04 VITALS
BODY MASS INDEX: 26.41 KG/M2 | WEIGHT: 131 LBS | HEIGHT: 59 IN | SYSTOLIC BLOOD PRESSURE: 140 MMHG | OXYGEN SATURATION: 98 % | RESPIRATION RATE: 19 BRPM | TEMPERATURE: 97.7 F | HEART RATE: 79 BPM | DIASTOLIC BLOOD PRESSURE: 103 MMHG

## 2025-02-04 DIAGNOSIS — R51.9 ACUTE INTRACTABLE HEADACHE, UNSPECIFIED HEADACHE TYPE: ICD-10-CM

## 2025-02-04 DIAGNOSIS — I10 PRIMARY HYPERTENSION: Primary | ICD-10-CM

## 2025-02-04 LAB
ALBUMIN SERPL-MCNC: 3.8 G/DL (ref 3.5–5)
ALBUMIN/GLOB SERPL: 1.1 (ref 1–1.9)
ALP SERPL-CCNC: 101 U/L (ref 35–104)
ALT SERPL-CCNC: 22 U/L (ref 8–45)
ANION GAP SERPL CALC-SCNC: 11 MMOL/L (ref 7–16)
AST SERPL-CCNC: 22 U/L (ref 15–37)
BASOPHILS # BLD: 0.03 K/UL (ref 0–0.2)
BASOPHILS NFR BLD: 0.4 % (ref 0–2)
BILIRUB SERPL-MCNC: <0.2 MG/DL (ref 0–1.2)
BUN SERPL-MCNC: 11 MG/DL (ref 6–23)
CALCIUM SERPL-MCNC: 9.1 MG/DL (ref 8.8–10.2)
CHLORIDE SERPL-SCNC: 102 MMOL/L (ref 98–107)
CO2 SERPL-SCNC: 25 MMOL/L (ref 20–29)
CREAT SERPL-MCNC: 0.7 MG/DL (ref 0.6–1.1)
DIFFERENTIAL METHOD BLD: NORMAL
EOSINOPHIL # BLD: 0.05 K/UL (ref 0–0.8)
EOSINOPHIL NFR BLD: 0.7 % (ref 0.5–7.8)
ERYTHROCYTE [DISTWIDTH] IN BLOOD BY AUTOMATED COUNT: 14 % (ref 11.9–14.6)
GLOBULIN SER CALC-MCNC: 3.4 G/DL (ref 2.3–3.5)
GLUCOSE SERPL-MCNC: 116 MG/DL (ref 70–99)
HCT VFR BLD AUTO: 43.5 % (ref 35.8–46.3)
HGB BLD-MCNC: 14.6 G/DL (ref 11.7–15.4)
IMM GRANULOCYTES # BLD AUTO: 0.03 K/UL (ref 0–0.5)
IMM GRANULOCYTES NFR BLD AUTO: 0.4 % (ref 0–5)
LYMPHOCYTES # BLD: 1.32 K/UL (ref 0.5–4.6)
LYMPHOCYTES NFR BLD: 18.3 % (ref 13–44)
MCH RBC QN AUTO: 28.2 PG (ref 26.1–32.9)
MCHC RBC AUTO-ENTMCNC: 33.6 G/DL (ref 31.4–35)
MCV RBC AUTO: 84.1 FL (ref 82–102)
MONOCYTES # BLD: 0.43 K/UL (ref 0.1–1.3)
MONOCYTES NFR BLD: 6 % (ref 4–12)
NEUTS SEG # BLD: 5.36 K/UL (ref 1.7–8.2)
NEUTS SEG NFR BLD: 74.2 % (ref 43–78)
NRBC # BLD: 0 K/UL (ref 0–0.2)
PLATELET # BLD AUTO: 270 K/UL (ref 150–450)
PMV BLD AUTO: 10.1 FL (ref 9.4–12.3)
POTASSIUM SERPL-SCNC: 4.2 MMOL/L (ref 3.5–5.1)
PROT SERPL-MCNC: 7.2 G/DL (ref 6.3–8.2)
RBC # BLD AUTO: 5.17 M/UL (ref 4.05–5.2)
SODIUM SERPL-SCNC: 138 MMOL/L (ref 136–145)
WBC # BLD AUTO: 7.2 K/UL (ref 4.3–11.1)

## 2025-02-04 PROCEDURE — 80053 COMPREHEN METABOLIC PANEL: CPT

## 2025-02-04 PROCEDURE — 99283 EMERGENCY DEPT VISIT LOW MDM: CPT

## 2025-02-04 PROCEDURE — 85025 COMPLETE CBC W/AUTO DIFF WBC: CPT

## 2025-02-04 ASSESSMENT — PAIN SCALES - GENERAL
PAINLEVEL_OUTOF10: 3
PAINLEVEL_OUTOF10: 8

## 2025-02-04 ASSESSMENT — ENCOUNTER SYMPTOMS
ABDOMINAL PAIN: 0
SHORTNESS OF BREATH: 0

## 2025-02-04 ASSESSMENT — PAIN - FUNCTIONAL ASSESSMENT: PAIN_FUNCTIONAL_ASSESSMENT: 0-10

## 2025-02-04 NOTE — ED PROVIDER NOTES
Emergency Department Provider Note       PCP: Supriya Mace MD   Age: 50 y.o.   Sex: female     DISPOSITION Decision To Discharge 02/04/2025 03:33:28 AM    ICD-10-CM    1. Primary hypertension  I10       2. Acute intractable headache, unspecified headache type  R51.9           Medical Decision Making     50-year-old female presents to the emergency department today with complaint of a headache and elevated blood pressure.  She appears in no acute distress.  No red flag symptoms.  Headache appears to be consistent with tension type headache.  No meningeal signs.  No focal neurological deficits noted on exam.  She is a bit hypertensive here.  She does not wish to be treated for this at this time.  She states that she is afraid of taking blood pressure medications and would like to hold off on medications at this time.  She declines any treatment for headache.  Exam is reassuring.  Through shared decision making, we will check some labs here today.    Labs unremarkable.  Normal kidney function.  Results discussed with the patient.  Her orthostatic vital signs are unremarkable.  Advised her to begin taking her blood pressure medication as prescribed by her PCP.  She states she was prescribed losartan 50 mg.  Discussed that she can begin by taking 1/2 tablet once daily to see how her blood pressure responds to this but I do feel that she needs to begin treating her blood pressure.  Patient agrees.  Encourage close follow-up with PCP.  Strict ER return precautions have been discussed but she does appear stable for discharge at this time.     1 or more acute illnesses that pose a threat to life or bodily function.   Shared medical decision making was utilized in creating the patients health plan today.  I independently ordered and reviewed each unique test.    I reviewed external records: provider visit note from PCP.  I reviewed external records: provider visit note from outside specialist.  I reviewed external

## 2025-02-04 NOTE — DISCHARGE INSTRUCTIONS
Your blood work in the emergency department today is normal.  Your exam is reassuring.  Take your medication as prescribed by your doctor.  Return to the emergency department if you develop any new, worsening, or concerning symptoms.

## 2025-02-04 NOTE — ED TRIAGE NOTES
Patient arrives via EMS from home with headache. States this has been on going for 2-3 days. States worse when standing up. Ems noted patient blood pressure was elevated. Ems gave 4mg zofran in route.

## 2025-02-04 NOTE — ED NOTES
Orthostatics Blood pressures as follows.    Lying /98  HR 80    Sitting /92 HR 85    Standing  /1103 HR 87     Rj Costa RN  02/04/25 0326

## 2025-02-12 ENCOUNTER — OFFICE VISIT (OUTPATIENT)
Dept: OBGYN CLINIC | Age: 50
End: 2025-02-12
Payer: COMMERCIAL

## 2025-02-12 VITALS — BODY MASS INDEX: 26.46 KG/M2 | DIASTOLIC BLOOD PRESSURE: 80 MMHG | WEIGHT: 131 LBS | SYSTOLIC BLOOD PRESSURE: 138 MMHG

## 2025-02-12 DIAGNOSIS — N95.1 PERIMENOPAUSE: ICD-10-CM

## 2025-02-12 DIAGNOSIS — F33.0 MILD EPISODE OF RECURRENT MAJOR DEPRESSIVE DISORDER (HCC): Primary | ICD-10-CM

## 2025-02-12 DIAGNOSIS — F41.9 ANXIETY: ICD-10-CM

## 2025-02-12 PROCEDURE — 99213 OFFICE O/P EST LOW 20 MIN: CPT | Performed by: OBSTETRICS & GYNECOLOGY

## 2025-02-12 RX ORDER — DULOXETIN HYDROCHLORIDE 30 MG/1
CAPSULE, DELAYED RELEASE ORAL
Qty: 60 CAPSULE | Refills: 3 | Status: SHIPPED | OUTPATIENT
Start: 2025-02-12

## 2025-02-12 ASSESSMENT — ENCOUNTER SYMPTOMS
ALLERGIC/IMMUNOLOGIC NEGATIVE: 1
GASTROINTESTINAL NEGATIVE: 1
RESPIRATORY NEGATIVE: 1
EYES NEGATIVE: 1

## 2025-02-12 NOTE — PROGRESS NOTES
Name: Brittany Wei  Date: 2025  YOB: 1975  LMP: Patient's last menstrual period was 2024.      Brittany Sánchez is a 50 y.o.   who is here for a follow up for bp check  so we can start cymbalta. She has been on her meds for 2 weeks and is following up with her primary this week.     Brittany Sánchez  reports being sexually active and has had partner(s) who are male. She reports using the following method of birth control/protection: Surgical.  Contraception: status post hysterectomy.   Menstrual status: none  Gyn Surgery:  hyst      Women's Health Timeline:  10/24: robotic hyst  due to complex atypical hyperplasia      Pap History:  Diagnosis:   Date Value Ref Range Status   2025 Comment   Final     Comment:     (NOTE)  NEGATIVE FOR INTRAEPITHELIAL LESION OR MALIGNANCY.     2021 Comment  Final     Comment:     NEGATIVE FOR INTRAEPITHELIAL LESION OR MALIGNANCY.     HPV Aptima   Date Value Ref Range Status   2025 Negative Negative   Final     Comment:     (NOTE)  This nucleic acid amplification test detects fourteen high-risk  HPV types (16,18,31,33,35,39,45,51,52,56,58,59,66,68) without  differentiation.       HPV Genotype Reflex   Date Value Ref Range Status   2025 Comment   Final     Comment:     (NOTE)  Criteria not met, HPV Genotype not performed.  No. of containers..01 ThinPrep Vial  Performed At: [facility]  [address] [place] [id number]  Moreno Saha MD Ph:[phone]  Performed At:  [facility]  [address] [place], [place] [id number]  Moreno Saha MD Ph:[phone]          OB History:  OB History          4    Para   3    Term   3            AB   1    Living   2         SAB   1    IAB        Ectopic        Molar        Multiple        Live Births   2                 Medical History:  Past Medical History:  No date: Anemia  No date: Complex atypical endometrial hyperplasia  No date: Fibroid,

## 2025-06-25 ENCOUNTER — OFFICE VISIT (OUTPATIENT)
Dept: OBGYN CLINIC | Age: 50
End: 2025-06-25
Payer: COMMERCIAL

## 2025-06-25 VITALS — WEIGHT: 128 LBS | SYSTOLIC BLOOD PRESSURE: 152 MMHG | DIASTOLIC BLOOD PRESSURE: 90 MMHG | BODY MASS INDEX: 25.85 KG/M2

## 2025-06-25 DIAGNOSIS — R53.83 OTHER FATIGUE: ICD-10-CM

## 2025-06-25 DIAGNOSIS — E55.9 VITAMIN D DEFICIENCY: Primary | ICD-10-CM

## 2025-06-25 LAB
25(OH)D3 SERPL-MCNC: 40.4 NG/ML (ref 30–100)
TSH W FREE THYROID IF ABNORMAL: 1.68 UIU/ML (ref 0.27–4.2)

## 2025-06-25 PROCEDURE — 99213 OFFICE O/P EST LOW 20 MIN: CPT | Performed by: OBSTETRICS & GYNECOLOGY

## 2025-06-25 RX ORDER — ESTRADIOL 0.05 MG/D
1 PATCH, EXTENDED RELEASE TRANSDERMAL
Qty: 8 PATCH | Refills: 3 | Status: SHIPPED | OUTPATIENT
Start: 2025-06-26

## 2025-06-25 RX ORDER — ESTRADIOL 0.1 MG/G
1 CREAM VAGINAL
Qty: 42.5 G | Refills: 3 | Status: SHIPPED | OUTPATIENT
Start: 2025-06-25

## 2025-06-25 ASSESSMENT — ENCOUNTER SYMPTOMS
ALLERGIC/IMMUNOLOGIC NEGATIVE: 1
EYES NEGATIVE: 1
GASTROINTESTINAL NEGATIVE: 1
RESPIRATORY NEGATIVE: 1

## 2025-06-25 NOTE — PROGRESS NOTES
Fibroid, uterine  2024: History of blood transfusion  No date: Menopausal symptoms  No date: Migraine  No date: Prolonged emergence from general anesthesia     Surgical History:  Past Surgical History:  ,:  SECTION      Comment:  x2  : DILATION AND CURETTAGE OF UTERUS  2024: HYSTERECTOMY (CERVIX STATUS UNKNOWN)  2022: HYSTEROSCOPY  No date: LAP,TUBAL CAUTERY  2022: LAPAROSCOPY; N/A      Comment:  EXAM UNDER ANESTHESIA, VAGINAL MYOMECTOMY, HYSTEROSCOPY,               DILATION AND CURETTAGE, MULTIPLE POLYPECTOMY performed by               Cherry Light MD at [facility] MAIN OR  2022: MYOMECTOMY  06/10/2001: TUBAL LIGATION     Meds:    Current Outpatient Medications:     DULoxetine (CYMBALTA) 30 MG extended release capsule, Take 1 pill po qd x7 days then take 2 pills po qd., Disp: 60 capsule, Rfl: 3    losartan (COZAAR) 50 MG tablet, Take 1 tablet by mouth daily, Disp: , Rfl:     vitamin D (ERGOCALCIFEROL) 1.25 MG (66206 UT) CAPS capsule, Take 1 capsule by mouth once a week, Disp: 12 capsule, Rfl: 1    vitamin D (ERGOCALCIFEROL) 1.25 MG (06195 UT) CAPS capsule, Take 1 capsule by mouth once a week, Disp: 6 capsule, Rfl: 0    ibuprofen (ADVIL;MOTRIN) 800 MG tablet, Take 1 tablet by mouth every 6 hours as needed for Pain, Disp: 90 tablet, Rfl: 0    ferrous sulfate 220 (44 Fe) MG/5ML solution, Take by mouth daily , Disp: , Rfl:     LORazepam (ATIVAN) 0.5 MG tablet, Take 1 tab twice a day as needed for anxiety. (Patient not taking: Reported on 2022), Disp: , Rfl:     Family Cancer History:   Cancer-related family history includes Cancer in her sister. There is no history of Ovarian Cancer, Colon Cancer, or Breast Cancer.     Social History:    reports that she has quit smoking. She has never used smokeless tobacco. She reports that she does not currently use alcohol. She reports that she does not use drugs.    Review Of Systems:  Review of Systems   Constitutional: Negative.

## 2025-06-26 ENCOUNTER — RESULTS FOLLOW-UP (OUTPATIENT)
Dept: OBGYN CLINIC | Age: 50
End: 2025-06-26

## 2025-08-12 RX ORDER — ESTRADIOL 0.05 MG/D
PATCH, EXTENDED RELEASE TRANSDERMAL
Qty: 24 PATCH | Refills: 2 | OUTPATIENT
Start: 2025-08-12

## 2025-08-12 RX ORDER — ESTRADIOL 0.05 MG/D
1 PATCH, EXTENDED RELEASE TRANSDERMAL
Qty: 24 PATCH | Refills: 1 | Status: SHIPPED | OUTPATIENT
Start: 2025-08-14

## (undated) DEVICE — TUBING, SUCTION, 1/4" X 10', STRAIGHT: Brand: MEDLINE

## (undated) DEVICE — DRAPE,UNDERBUTTOCKS,PCH,STERILE: Brand: MEDLINE

## (undated) DEVICE — CARDINAL HEALTH FLEXIBLE LIGHT HANDLE COVER: Brand: CARDINAL HEALTH

## (undated) DEVICE — GAUZE,SPONGE,8"X4",12PLY,XRAY,STRL,LF: Brand: MEDLINE

## (undated) DEVICE — CONTAINER,SPECIMEN,O.R.STRL,4.5OZ: Brand: MEDLINE

## (undated) DEVICE — DRAPE TWL SURG 16X26IN BLU ORB04] ALLCARE INC]

## (undated) DEVICE — SKIN PREP TRAY 4 COMPARTM TRAY: Brand: MEDLINE INDUSTRIES, INC.

## (undated) DEVICE — SOLUTION IRRIG 3000ML 0.9% SOD CHL USP UROMATIC PLAS CONT

## (undated) DEVICE — SUTURE ENDOLOOP SZ 0 L18IN ABSRB VLT LIG SLDE KNOT VCRL

## (undated) DEVICE — MINOR LITHOTOMY PACK: Brand: MEDLINE INDUSTRIES, INC.

## (undated) DEVICE — SOLUTION IV 1000ML 0.9% SODIUM CHLORIDE

## (undated) DEVICE — ELECTRODE PT RET AD L9FT HI MOIST COND ADH HYDRGEL CORDED

## (undated) DEVICE — YANKAUER,BULB TIP,W/O VENT,RIGID,STERILE: Brand: MEDLINE